# Patient Record
Sex: MALE | Race: WHITE | NOT HISPANIC OR LATINO | Employment: FULL TIME | URBAN - METROPOLITAN AREA
[De-identification: names, ages, dates, MRNs, and addresses within clinical notes are randomized per-mention and may not be internally consistent; named-entity substitution may affect disease eponyms.]

---

## 2019-09-21 ENCOUNTER — HOSPITAL ENCOUNTER (EMERGENCY)
Facility: MEDICAL CENTER | Age: 39
End: 2019-09-21
Attending: EMERGENCY MEDICINE
Payer: COMMERCIAL

## 2019-09-21 VITALS
HEIGHT: 76 IN | TEMPERATURE: 98.2 F | BODY MASS INDEX: 30.34 KG/M2 | DIASTOLIC BLOOD PRESSURE: 74 MMHG | RESPIRATION RATE: 16 BRPM | HEART RATE: 78 BPM | WEIGHT: 249.12 LBS | SYSTOLIC BLOOD PRESSURE: 156 MMHG | OXYGEN SATURATION: 100 %

## 2019-09-21 DIAGNOSIS — K70.10 CHRONIC ALCOHOLIC HEPATITIS: ICD-10-CM

## 2019-09-21 DIAGNOSIS — F41.9 ANXIETY: ICD-10-CM

## 2019-09-21 LAB
ALBUMIN SERPL BCP-MCNC: 5 G/DL (ref 3.2–4.9)
ALBUMIN/GLOB SERPL: 2 G/DL
ALP SERPL-CCNC: 59 U/L (ref 30–99)
ALT SERPL-CCNC: 59 U/L (ref 2–50)
ANION GAP SERPL CALC-SCNC: 12 MMOL/L (ref 0–11.9)
AST SERPL-CCNC: 98 U/L (ref 12–45)
BASOPHILS # BLD AUTO: 0.6 % (ref 0–1.8)
BASOPHILS # BLD: 0.04 K/UL (ref 0–0.12)
BILIRUB SERPL-MCNC: 0.8 MG/DL (ref 0.1–1.5)
BUN SERPL-MCNC: 10 MG/DL (ref 8–22)
CALCIUM SERPL-MCNC: 9.6 MG/DL (ref 8.5–10.5)
CHLORIDE SERPL-SCNC: 101 MMOL/L (ref 96–112)
CO2 SERPL-SCNC: 23 MMOL/L (ref 20–33)
CREAT SERPL-MCNC: 0.93 MG/DL (ref 0.5–1.4)
EKG IMPRESSION: NORMAL
EOSINOPHIL # BLD AUTO: 0.11 K/UL (ref 0–0.51)
EOSINOPHIL NFR BLD: 1.6 % (ref 0–6.9)
ERYTHROCYTE [DISTWIDTH] IN BLOOD BY AUTOMATED COUNT: 41.8 FL (ref 35.9–50)
GLOBULIN SER CALC-MCNC: 2.5 G/DL (ref 1.9–3.5)
GLUCOSE SERPL-MCNC: 96 MG/DL (ref 65–99)
HCT VFR BLD AUTO: 47.7 % (ref 42–52)
HGB BLD-MCNC: 16.2 G/DL (ref 14–18)
IMM GRANULOCYTES # BLD AUTO: 0.07 K/UL (ref 0–0.11)
IMM GRANULOCYTES NFR BLD AUTO: 1 % (ref 0–0.9)
LYMPHOCYTES # BLD AUTO: 1.39 K/UL (ref 1–4.8)
LYMPHOCYTES NFR BLD: 20.4 % (ref 22–41)
MAGNESIUM SERPL-MCNC: 1.8 MG/DL (ref 1.5–2.5)
MCH RBC QN AUTO: 29.7 PG (ref 27–33)
MCHC RBC AUTO-ENTMCNC: 34 G/DL (ref 33.7–35.3)
MCV RBC AUTO: 87.4 FL (ref 81.4–97.8)
MONOCYTES # BLD AUTO: 0.76 K/UL (ref 0–0.85)
MONOCYTES NFR BLD AUTO: 11.2 % (ref 0–13.4)
NEUTROPHILS # BLD AUTO: 4.43 K/UL (ref 1.82–7.42)
NEUTROPHILS NFR BLD: 65.2 % (ref 44–72)
NRBC # BLD AUTO: 0 K/UL
NRBC BLD-RTO: 0 /100 WBC
PLATELET # BLD AUTO: 143 K/UL (ref 164–446)
PMV BLD AUTO: 10.9 FL (ref 9–12.9)
POTASSIUM SERPL-SCNC: 3.7 MMOL/L (ref 3.6–5.5)
PROT SERPL-MCNC: 7.5 G/DL (ref 6–8.2)
RBC # BLD AUTO: 5.46 M/UL (ref 4.7–6.1)
SODIUM SERPL-SCNC: 136 MMOL/L (ref 135–145)
WBC # BLD AUTO: 6.8 K/UL (ref 4.8–10.8)

## 2019-09-21 PROCEDURE — 93005 ELECTROCARDIOGRAM TRACING: CPT

## 2019-09-21 PROCEDURE — 99284 EMERGENCY DEPT VISIT MOD MDM: CPT

## 2019-09-21 PROCEDURE — 96374 THER/PROPH/DIAG INJ IV PUSH: CPT

## 2019-09-21 PROCEDURE — 93005 ELECTROCARDIOGRAM TRACING: CPT | Performed by: EMERGENCY MEDICINE

## 2019-09-21 PROCEDURE — 700111 HCHG RX REV CODE 636 W/ 250 OVERRIDE (IP): Performed by: EMERGENCY MEDICINE

## 2019-09-21 PROCEDURE — 83735 ASSAY OF MAGNESIUM: CPT

## 2019-09-21 PROCEDURE — 85025 COMPLETE CBC W/AUTO DIFF WBC: CPT

## 2019-09-21 PROCEDURE — 80053 COMPREHEN METABOLIC PANEL: CPT

## 2019-09-21 RX ORDER — LORAZEPAM 1 MG/1
1 TABLET ORAL EVERY 4 HOURS PRN
Qty: 15 TAB | Refills: 0 | Status: SHIPPED | OUTPATIENT
Start: 2019-09-21 | End: 2019-09-26

## 2019-09-21 RX ORDER — LORAZEPAM 2 MG/ML
1 INJECTION INTRAMUSCULAR ONCE
Status: COMPLETED | OUTPATIENT
Start: 2019-09-21 | End: 2019-09-21

## 2019-09-21 RX ADMIN — LORAZEPAM 1 MG: 2 INJECTION INTRAMUSCULAR; INTRAVENOUS at 13:02

## 2019-09-21 SDOH — HEALTH STABILITY: MENTAL HEALTH: HOW OFTEN DO YOU HAVE A DRINK CONTAINING ALCOHOL?: NEVER

## 2019-09-21 NOTE — ED NOTES
Pt states anxiety is much better after ativan given. Pt resting in bed NAD. Will continue to monitor

## 2019-09-21 NOTE — ED PROVIDER NOTES
"ED Provider Note    CHIEF COMPLAINT  Chief Complaint   Patient presents with   • Anxiety     Pt reports some recent new medical problems, recent admit/ d/c from [facility] for blood in stool, pt states that he had recent psychotic break and also spent some time at [facility]. pt reporting in triage that he feels he has something terminal and is going to \"drop dead\". Pt denies SI/HI   • Palpitations     came and went yesterday, not currently present   • Stress     states recent stress in life is causing him to feel overwhelmed.        HPI  Rigoberto Mckeon is a 38 y.o. male who presents for evaluation of anxiety palpitations.  The patient apparently has a history of long-standing anxiety.  He admits that he self treats with alcohol on a regular basis.  He was seen at [facility] apparently last week for crampy abdominal pain and some hematochezia.  He reports he had blood tests and a CAT scan which were reportedly unremarkable.  He admits to having a few beers last night.  He specifically denies auditory or visual hallucinations, suicidal ideation or planning.  He did reports he gets very anxious and would like to stop drinking but the alcohol cessation causes anxiety to get worse and then he reverts to drinking.  He has no history of withdrawal seizures.  No other symptoms reported    REVIEW OF SYSTEMS  See HPI for further details.  No chest pain fevers chills night sweats weight loss all other systems are negative.     PAST MEDICAL HISTORY  No past medical history on file.  History of alcohol abuse and anxiety  FAMILY HISTORY  Noncontributory    SOCIAL HISTORY  Social History     Socioeconomic History   • Marital status: Single     Spouse name: Not on file   • Number of children: Not on file   • Years of education: Not on file   • Highest education level: Not on file   Occupational History   • Not on file   Social Needs   • Financial resource strain: Not on file   • Food insecurity:     Worry: Not on " "file     Inability: Not on file   • Transportation needs:     Medical: Not on file     Non-medical: Not on file   Tobacco Use   • Smoking status: Never Smoker   • Smokeless tobacco: Never Used   Substance and Sexual Activity   • Alcohol use: Never     Frequency: Never   • Drug use: Yes     Comment: cocaine 1 wk ago   • Sexual activity: Not on file   Lifestyle   • Physical activity:     Days per week: Not on file     Minutes per session: Not on file   • Stress: Not on file   Relationships   • Social connections:     Talks on phone: Not on file     Gets together: Not on file     Attends Congregational service: Not on file     Active member of club or organization: Not on file     Attends meetings of clubs or organizations: Not on file     Relationship status: Not on file   • Intimate partner violence:     Fear of current or ex partner: Not on file     Emotionally abused: Not on file     Physically abused: Not on file     Forced sexual activity: Not on file   Other Topics Concern   • Not on file   Social History Narrative   • Not on file     Daily alcohol use  SURGICAL HISTORY  No past surgical history on file.  None reported  CURRENT MEDICATIONS  Home Medications    **Home medications have not yet been reviewed for this encounter**       No regular meds  ALLERGIES  No Known Allergies    PHYSICAL EXAM  VITAL SIGNS: /74   Pulse 78   Temp 36.8 °C (98.2 °F) (Temporal)   Resp 16   Ht 1.93 m (6' 4\")   Wt 113 kg (249 lb 1.9 oz)   SpO2 100%   BMI 30.32 kg/m²       Constitutional: Well developed, Well nourished, No acute distress, Non-toxic appearance.   HENT: Normocephalic, Atraumatic, Bilateral external ears normal, Oropharynx moist, No oral exudates, Nose normal.   Eyes: PERRLA, EOMI, Conjunctiva normal, No discharge.   Neck: Normal range of motion, No tenderness, Supple, No stridor.   Cardiovascular: Mildly tachycardic, Normal rhythm, No murmurs, No rubs, No gallops.   Thorax & Lungs: Normal breath sounds, No " respiratory distress, No wheezing, No chest tenderness.   Abdomen: Bowel sounds normal, Soft, No tenderness, No masses, No pulsatile masses.   Skin: Warm, Dry, No erythema, No rash.   Back: No tenderness, No CVA tenderness.   Extremities: Intact distal pulses, No edema, No tenderness, No cyanosis, No clubbing.   Musculoskeletal: Good range of motion in all major joints. No tenderness to palpation or major deformities noted.   Neurologic: Alert & oriented x 3, Normal motor function, Normal sensory function, No focal deficits noted.   Psychiatric: Anxious no suggestion of suicidal ideation no suggestion of psychosis l.       EKG interpretation by me sinus rhythm no acute ST segment elevation or depression no biological T wave inversion no ectopy intervals and axis are normal no suggestion of arrhythmia ectopy or ischemia  COURSE & MEDICAL DECISION MAKING  Pertinent Labs & Imaging studies reviewed. (See chart for details)  Results for orders placed or performed during the hospital encounter of 09/21/19   CBC WITH DIFFERENTIAL   Result Value Ref Range    WBC 6.8 4.8 - 10.8 K/uL    RBC 5.46 4.70 - 6.10 M/uL    Hemoglobin 16.2 14.0 - 18.0 g/dL    Hematocrit 47.7 42.0 - 52.0 %    MCV 87.4 81.4 - 97.8 fL    MCH 29.7 27.0 - 33.0 pg    MCHC 34.0 33.7 - 35.3 g/dL    RDW 41.8 35.9 - 50.0 fL    Platelet Count 143 (L) 164 - 446 K/uL    MPV 10.9 9.0 - 12.9 fL    Neutrophils-Polys 65.20 44.00 - 72.00 %    Lymphocytes 20.40 (L) 22.00 - 41.00 %    Monocytes 11.20 0.00 - 13.40 %    Eosinophils 1.60 0.00 - 6.90 %    Basophils 0.60 0.00 - 1.80 %    Immature Granulocytes 1.00 (H) 0.00 - 0.90 %    Nucleated RBC 0.00 /100 WBC    Neutrophils (Absolute) 4.43 1.82 - 7.42 K/uL    Lymphs (Absolute) 1.39 1.00 - 4.80 K/uL    Monos (Absolute) 0.76 0.00 - 0.85 K/uL    Eos (Absolute) 0.11 0.00 - 0.51 K/uL    Baso (Absolute) 0.04 0.00 - 0.12 K/uL    Immature Granulocytes (abs) 0.07 0.00 - 0.11 K/uL    NRBC (Absolute) 0.00 K/uL   Comp Metabolic Panel    Result Value Ref Range    Sodium 136 135 - 145 mmol/L    Potassium 3.7 3.6 - 5.5 mmol/L    Chloride 101 96 - 112 mmol/L    Co2 23 20 - 33 mmol/L    Anion Gap 12.0 (H) 0.0 - 11.9    Glucose 96 65 - 99 mg/dL    Bun 10 8 - 22 mg/dL    Creatinine 0.93 0.50 - 1.40 mg/dL    Calcium 9.6 8.5 - 10.5 mg/dL    AST(SGOT) 98 (H) 12 - 45 U/L    ALT(SGPT) 59 (H) 2 - 50 U/L    Alkaline Phosphatase 59 30 - 99 U/L    Total Bilirubin 0.8 0.1 - 1.5 mg/dL    Albumin 5.0 (H) 3.2 - 4.9 g/dL    Total Protein 7.5 6.0 - 8.2 g/dL    Globulin 2.5 1.9 - 3.5 g/dL    A-G Ratio 2.0 g/dL   MAGNESIUM   Result Value Ref Range    Magnesium 1.8 1.5 - 2.5 mg/dL   ESTIMATED GFR   Result Value Ref Range    GFR If African American >60 >60 mL/min/1.73 m 2    GFR If Non African American >60 >60 mL/min/1.73 m 2   EKG (NOW)   Result Value Ref Range    Report       [facility] Emergency Dept.    Test Date:  2019  Pt Name:    JEFFREY VALENTINO              Department: ER  MRN:        [id number]                      Room:  Gender:     Male                         Technician: [id number]  :        1980                   Requested By:ER TRIAGE PROTOCOL  Order #:    [id number]                    Reading MD: BASSEM YBARRA MD    Measurements  Intervals                                Axis  Rate:       97                           P:          40  DC:         160                          QRS:        47  QRSD:       96                           T:          39  QT:         352  QTc:        447    Interpretive Statements  SINUS RHYTHM  CONSIDER LEFT ATRIAL ABNORMALITY  No previous ECG available for comparison    Electronically Signed On 2019 12:41:11 PDT by BASSEM YBARRA MD        Patient presents with palpitations.  He has no chest pain.  He was monitored on telemetry for several hours had no evidence of arrhythmia or tachydysrhythmia.  His laboratory studies are notable for normal CBC no evidence of infection or anemia.  His metabolic  panel is notable for some mild transaminitis consistent with alcohol abuse.  Multiple attempts at getting results from [facility] were attempted but they never faxed over the results.  The patient has no belly pain currently.  I will give him a small prescription of lorazepam for mild alcohol withdrawal and anxiety but advised him to follow-up with his PCP    FINAL IMPRESSION  1.  Anxiety  2.  Mild alcohol hepatitis         Electronically signed by: Dyllan Fuller, 9/21/2019 12:40 PM

## 2019-09-21 NOTE — ED TRIAGE NOTES
"Chief Complaint   Patient presents with   • Anxiety     Pt reports some recent new medical problems, recent admit/ d/c from [facility] for blood in stool, pt states that he had recent psychotic break and also spent some time at [facility]. pt reporting in triage that he feels he has something terminal and is going to \"drop dead\". Pt denies SI/HI   • Palpitations     came and went yesterday, not currently present   • Stress     states recent stress in life is causing him to feel overwhelmed.      Explained to pt triage process, made pt aware to tell this RN/staff of any changes/concerns, pt verbalized understanding of process and instructions given. Pt to ER kailee.    "

## 2019-09-21 NOTE — ED NOTES
"Pt c/o anxiety with an \"impending doom\" feeling. Denies visual or auditory hallucinations. Denies SI or HI.  Pt states has had recent health issues to include blood in his stool and heart palpitations at home. Pt was seen at [facility] two days ago for bloody stools and states when he was there had a \"psychotic breakdown\" and \"thought people at the hospital were trying to kill me\"  and left [facility]- PD had to be called to calm pt down. Pt denies chest pain or palpitations now, just wants \"a mental and physical checkup.\" Pt hx of anxiety but does not take medication or is being seen by psych.   "

## 2019-09-30 ENCOUNTER — HOSPITAL ENCOUNTER (EMERGENCY)
Facility: MEDICAL CENTER | Age: 39
End: 2019-10-01
Attending: EMERGENCY MEDICINE
Payer: COMMERCIAL

## 2019-09-30 DIAGNOSIS — F41.9 ANXIETY: ICD-10-CM

## 2019-09-30 DIAGNOSIS — G47.00 INSOMNIA, UNSPECIFIED TYPE: ICD-10-CM

## 2019-09-30 LAB
ALBUMIN SERPL BCP-MCNC: 4.6 G/DL (ref 3.2–4.9)
ALBUMIN/GLOB SERPL: 2.1 G/DL
ALP SERPL-CCNC: 50 U/L (ref 30–99)
ALT SERPL-CCNC: 32 U/L (ref 2–50)
AMPHET UR QL SCN: NEGATIVE
ANION GAP SERPL CALC-SCNC: 10 MMOL/L (ref 0–11.9)
APPEARANCE UR: CLEAR
AST SERPL-CCNC: 31 U/L (ref 12–45)
BARBITURATES UR QL SCN: NEGATIVE
BASOPHILS # BLD AUTO: 0.6 % (ref 0–1.8)
BASOPHILS # BLD: 0.04 K/UL (ref 0–0.12)
BENZODIAZ UR QL SCN: NEGATIVE
BILIRUB SERPL-MCNC: 0.5 MG/DL (ref 0.1–1.5)
BILIRUB UR QL STRIP.AUTO: NEGATIVE
BUN SERPL-MCNC: 9 MG/DL (ref 8–22)
BZE UR QL SCN: NEGATIVE
CALCIUM SERPL-MCNC: 8.9 MG/DL (ref 8.5–10.5)
CANNABINOIDS UR QL SCN: POSITIVE
CHLORIDE SERPL-SCNC: 102 MMOL/L (ref 96–112)
CO2 SERPL-SCNC: 25 MMOL/L (ref 20–33)
COLOR UR: YELLOW
CREAT SERPL-MCNC: 1.14 MG/DL (ref 0.5–1.4)
EOSINOPHIL # BLD AUTO: 0.14 K/UL (ref 0–0.51)
EOSINOPHIL NFR BLD: 1.9 % (ref 0–6.9)
ERYTHROCYTE [DISTWIDTH] IN BLOOD BY AUTOMATED COUNT: 43.8 FL (ref 35.9–50)
ETHANOL BLD-MCNC: 0.01 G/DL
GLOBULIN SER CALC-MCNC: 2.2 G/DL (ref 1.9–3.5)
GLUCOSE SERPL-MCNC: 97 MG/DL (ref 65–99)
GLUCOSE UR STRIP.AUTO-MCNC: NEGATIVE MG/DL
HCT VFR BLD AUTO: 46.2 % (ref 42–52)
HGB BLD-MCNC: 16 G/DL (ref 14–18)
IMM GRANULOCYTES # BLD AUTO: 0.1 K/UL (ref 0–0.11)
IMM GRANULOCYTES NFR BLD AUTO: 1.4 % (ref 0–0.9)
KETONES UR STRIP.AUTO-MCNC: NEGATIVE MG/DL
LEUKOCYTE ESTERASE UR QL STRIP.AUTO: NEGATIVE
LIPASE SERPL-CCNC: 23 U/L (ref 11–82)
LYMPHOCYTES # BLD AUTO: 2.08 K/UL (ref 1–4.8)
LYMPHOCYTES NFR BLD: 28.9 % (ref 22–41)
MAGNESIUM SERPL-MCNC: 1.7 MG/DL (ref 1.5–2.5)
MCH RBC QN AUTO: 30.8 PG (ref 27–33)
MCHC RBC AUTO-ENTMCNC: 34.6 G/DL (ref 33.7–35.3)
MCV RBC AUTO: 89 FL (ref 81.4–97.8)
METHADONE UR QL SCN: NEGATIVE
MICRO URNS: NORMAL
MONOCYTES # BLD AUTO: 0.78 K/UL (ref 0–0.85)
MONOCYTES NFR BLD AUTO: 10.8 % (ref 0–13.4)
NEUTROPHILS # BLD AUTO: 4.06 K/UL (ref 1.82–7.42)
NEUTROPHILS NFR BLD: 56.4 % (ref 44–72)
NITRITE UR QL STRIP.AUTO: NEGATIVE
NRBC # BLD AUTO: 0 K/UL
NRBC BLD-RTO: 0 /100 WBC
OPIATES UR QL SCN: NEGATIVE
OXYCODONE UR QL SCN: NEGATIVE
PCP UR QL SCN: NEGATIVE
PH UR STRIP.AUTO: 5 [PH] (ref 5–8)
PLATELET # BLD AUTO: 162 K/UL (ref 164–446)
PMV BLD AUTO: 10.4 FL (ref 9–12.9)
POTASSIUM SERPL-SCNC: 4.1 MMOL/L (ref 3.6–5.5)
PROPOXYPH UR QL SCN: NEGATIVE
PROT SERPL-MCNC: 6.8 G/DL (ref 6–8.2)
PROT UR QL STRIP: NEGATIVE MG/DL
RBC # BLD AUTO: 5.19 M/UL (ref 4.7–6.1)
RBC UR QL AUTO: NEGATIVE
SODIUM SERPL-SCNC: 137 MMOL/L (ref 135–145)
SP GR UR STRIP.AUTO: 1.01
TROPONIN T SERPL-MCNC: 9 NG/L (ref 6–19)
UROBILINOGEN UR STRIP.AUTO-MCNC: 0.2 MG/DL
WBC # BLD AUTO: 7.2 K/UL (ref 4.8–10.8)

## 2019-09-30 PROCEDURE — 93005 ELECTROCARDIOGRAM TRACING: CPT | Performed by: EMERGENCY MEDICINE

## 2019-09-30 PROCEDURE — 83690 ASSAY OF LIPASE: CPT

## 2019-09-30 PROCEDURE — 83735 ASSAY OF MAGNESIUM: CPT

## 2019-09-30 PROCEDURE — 84484 ASSAY OF TROPONIN QUANT: CPT

## 2019-09-30 PROCEDURE — 80053 COMPREHEN METABOLIC PANEL: CPT

## 2019-09-30 PROCEDURE — 81003 URINALYSIS AUTO W/O SCOPE: CPT

## 2019-09-30 PROCEDURE — 80307 DRUG TEST PRSMV CHEM ANLYZR: CPT

## 2019-09-30 PROCEDURE — 85025 COMPLETE CBC W/AUTO DIFF WBC: CPT

## 2019-09-30 PROCEDURE — A9270 NON-COVERED ITEM OR SERVICE: HCPCS | Performed by: EMERGENCY MEDICINE

## 2019-09-30 PROCEDURE — 99285 EMERGENCY DEPT VISIT HI MDM: CPT

## 2019-09-30 PROCEDURE — 700102 HCHG RX REV CODE 250 W/ 637 OVERRIDE(OP): Performed by: EMERGENCY MEDICINE

## 2019-09-30 RX ORDER — THIAMINE MONONITRATE (VIT B1) 100 MG
100 TABLET ORAL ONCE
Status: COMPLETED | OUTPATIENT
Start: 2019-09-30 | End: 2019-09-30

## 2019-09-30 RX ORDER — LORAZEPAM 1 MG/1
1 TABLET ORAL ONCE
Status: COMPLETED | OUTPATIENT
Start: 2019-09-30 | End: 2019-09-30

## 2019-09-30 RX ADMIN — LORAZEPAM 1 MG: 1 TABLET ORAL at 21:53

## 2019-09-30 RX ADMIN — Medication 100 MG: at 21:53

## 2019-09-30 SDOH — HEALTH STABILITY: MENTAL HEALTH: HOW MANY STANDARD DRINKS CONTAINING ALCOHOL DO YOU HAVE ON A TYPICAL DAY?: 10 OR MORE

## 2019-09-30 SDOH — HEALTH STABILITY: MENTAL HEALTH: HOW OFTEN DO YOU HAVE A DRINK CONTAINING ALCOHOL?: 4 OR MORE TIMES A WEEK

## 2019-09-30 ASSESSMENT — LIFESTYLE VARIABLES
HAVE PEOPLE ANNOYED YOU BY CRITICIZING YOUR DRINKING: NO
HAVE YOU EVER FELT YOU SHOULD CUT DOWN ON YOUR DRINKING: NO
DOES PATIENT WANT TO STOP DRINKING: NO
TOTAL SCORE: 0
DO YOU DRINK ALCOHOL: YES
EVER HAD A DRINK FIRST THING IN THE MORNING TO STEADY YOUR NERVES TO GET RID OF A HANGOVER: NO
EVER FELT BAD OR GUILTY ABOUT YOUR DRINKING: NO
TOTAL SCORE: 0
CONSUMPTION TOTAL: INCOMPLETE
TOTAL SCORE: 0

## 2019-10-01 VITALS
HEART RATE: 74 BPM | BODY MASS INDEX: 30.07 KG/M2 | HEIGHT: 76 IN | RESPIRATION RATE: 16 BRPM | OXYGEN SATURATION: 95 % | TEMPERATURE: 99 F | DIASTOLIC BLOOD PRESSURE: 97 MMHG | WEIGHT: 246.91 LBS | SYSTOLIC BLOOD PRESSURE: 127 MMHG

## 2019-10-01 LAB — EKG IMPRESSION: NORMAL

## 2019-10-01 PROCEDURE — 90791 PSYCH DIAGNOSTIC EVALUATION: CPT

## 2019-10-01 RX ORDER — ZOLPIDEM TARTRATE 5 MG/1
5 TABLET ORAL NIGHTLY PRN
Qty: 2 TAB | Refills: 0 | Status: SHIPPED | OUTPATIENT
Start: 2019-10-01 | End: 2019-10-03

## 2019-10-01 RX ORDER — LORAZEPAM 1 MG/1
1 TABLET ORAL EVERY 4 HOURS PRN
Qty: 5 TAB | Refills: 0 | Status: SHIPPED | OUTPATIENT
Start: 2019-10-01 | End: 2019-10-04

## 2019-10-01 NOTE — CONSULTS
"[facility]   TRIAGE ASSESSMENT    Name: Rigoberto Mckeon  MRN: [id number]  : 1980  Age: 38 y.o.  Date of assessment: 10/1/2019  PCP: Pcp Pt States None  Persons in attendance: Patient    CHIEF COMPLAINT/PRESENTING ISSUE (as stated by pt, erp, rn): This pt presents in the er with complaints of insomnia, racing thoughts, feelings of being delirious and paranoid of other people and situations with people. He appears very manic but denies symptoms of bipolar disorder. He has been binging on beer to cope and recently has used coke and marijuana to self medicate as well. He adamantly denies any si/hi or hallucinations. He denies any hx of these symptoms in the past.  Chief Complaint   Patient presents with   • Insomnia   • Other     \"something is not right with me.  my head isnt right.  i'm mentally not right\"        CURRENT LIVING SITUATION/SOCIAL SUPPORT: This pt lives alone and has no children.[place] He is an only child and his father is . He works at [employer] and notes that he has been experiencing a lot of job stress recently. He has some friends in [place] and claims he has an adequate social support system.    BEHAVIORAL HEALTH TREATMENT HISTORY  Does patient/parent report a history of prior behavioral health treatment for patient?   No:denies hx of    SAFETY ASSESSMENT - SELF  Does patient acknowledge current or past symptoms of dangerousness to self? no  Does parent/significant other report patient has current or past symptoms of dangerousness to self? N\A  Does presenting problem suggest symptoms of dangerousness to self? No pt denies any si or hx of self harm. He denies any access to a firearm. He agrees to seek help or return to the er if any thoughts of self harm develop.     SAFETY ASSESSMENT - OTHERS  Does patient acknowledge current or past symptoms of aggressive behavior or risk to others?no  Does parent/significant other report " "patient has current or past symptoms of aggressive behavior or risk to others?  N\A  Does presenting problem suggest symptoms of dangerousness to others? No pt denies any hi, or hx of hi.    Crisis Safety Plan completed and copy given to patient? yes    ABUSE/NEGLECT SCREENING  Does patient report feeling “unsafe” in his/her home, or afraid of anyone?  no  Does patient report any history of physical, sexual, or emotional abuse?  no  Does parent or significant other report any of the above? N\A  Is there evidence of neglect by self?  no  Is there evidence of neglect by a caregiver? no  Does the patient/parent report any history of CPS/APS/police involvement related to suspected abuse/neglect or domestic violence? no  Based on the information provided during the current assessment, is a mandated report of suspected abuse/neglect being made?  No    SUBSTANCE USE SCREENING  Yes:  Jaylen all substances used in the past 30 days:cocaine, beer, \"pot\"      Last Use Amount   [x]   Alcohol Last noc 12pack of beer   [x]   Marijuana 3days ago A few puffs   []   Heroin     []   Prescription Opioids  (used without prescription, for    recreation, or in excess of prescribed amount)     []   Other Prescription  (used without prescription, for    recreation, or in excess of prescribed amount)     [x]   Cocaine 3weeks ago  A few lines   []   Methamphetamine     []   \"\" drugs (ectasy, MDMA)     []   Other substances        UDS results: pos thc  Breathalyzer results: 0.01    What consequences does the patient associate with any of the above substance use and or addictive behaviors? Health problems: possible, Monetary problems:     Risk factors for detox (check all that apply):  []  Seizures   []  Diaphoretic (sweating)   []  Tremors   []  Hallucinations   []  Increased blood pressure   []  Decreased blood pressure   []  Other   [x]  None      [x] Patient education on risk factors for detoxification and instructed to return to ER " as needed.na    MENTAL STATUS   Participation: Active verbal participation, Attentive, Engaged, Open to feedback and Guarded  Grooming: Casual and Neat  Orientation: Alert and Fully Oriented  Behavior: Tense and Hyperactive  Eye contact: Good  Mood: Anxious and Manic  Affect: Constricted, Congruent with content, Sad and Anxious  Thought process: Logical and Goal-directed  Thought content: Paranoia(some slight noted)  Speech: Rate within normal limits, Volume within normal limits, Pressured and Hypertalkative  Perception: Within normal limits  Memory:  No gross evidence of memory deficits  Insight: Adequate  Judgment:  Adequate  Other:    Collateral information:   Source:  [] Significant other present in person:   [] Significant other by telephone  [] [facility]   [x] [facility] Nursing Staff  [x] [facility]  [x] Other:erp     [] Unable to complete full assessment due to:  [] Acute intoxication  [] Patient declined to participate/engage  [] Patient verbally unresponsive  [] Significant cognitive deficits  [] Significant perceptual distortions or behavioral disorganization  [x] Other: na     CLINICAL IMPRESSIONS:  Primary:  Lita/anxiety disorder possible sleep deprived psychosis  Secondary:  Etoh/ substance abuse       IDENTIFIED NEEDS/PLAN:  [Trigger DISPOSITION list for any items marked]    []  Imminent safety risk - self [] Imminent safety risk - others   []  Acute substance withdrawal []  Psychosis/Impaired reality testing   [x]  Mood/anxiety [x]  Substance use/Addictive behavior   [x]  Maladaptive behaviro []  Parent/child conflict   []  Family/Couples conflict [x]  Biomedical   []  Housing [x]  Financial   []   Legal  Occupational/Educational   []  Domestic violence []  Other:     Disposition: Actively being addressed by 12 Step program: er op referrals for psy, Primary Care Physician, [facility], [facility] and aa meetings    Does patient express agreement with the above plan?  yes    Referral appointment(s) scheduled? no    Alert team only:   I have discussed findings and recommendations with  who is in agreement with these recommendations.38y male presents with acute rebecca and sleep deprivation, denies si,hi and any debilitating psychosis. HE has been discharged home with op referrals and scp     Referral information sent to the following community providers :given to pt    If applicable : Referred  to : margi Mariano R.N.  10/1/2019

## 2019-10-01 NOTE — ED NOTES
Patient awake alert and oriented x 4, [place], bed in low position, call light within reach, on room air, unlabored breathing noted, no cough noted, interacts with staff, interactions noted as appropriate.  
Patient awake alert and oriented x 4, [address], bed in low position, call light within reach, on room air, attached to cardiac monitor, unlabored breathing noted, no cough noted, interacts with staff, interactions noted as appropriate, awaiting [facility] consult at this time.  
Patient awake alert and oriented x 4, [address] 15, bed in low position, call light within reach, on room air, attached to cardiac monitor, unlabored breathing noted, no cough noted, interacts with staff, interactions noted as appropriate, frequent rounding performed, will continue to assess patient.  
Patient awake alert and oriented x 4, [address] 15, bed in low position, call light within reach, on room air, attached to cardiac monitor, unlabored breathing noted, no cough noted, interacts with staff, interactions noted as appropriate, patient offered to watch television but refuses, frequent rounding performed, will continue to assess patient.  
Patient awake alert and oriented x 4, [address] 15, bed in low position, call light within reach, on room air, attached to cardiac monitor, unlabored breathing noted, no cough noted, interacts with staff, interactions noted as appropriate.  
Patient awake alert and oriented x 4, [address] 15, bed in low position, call light within reach, on room air, attached to cardiac monitor, unlabored breathing noted, no cough noted, interacts with staff, interactions noted as appropriate.  
Patient verbalized understanding of discharge instructions, provided with discharge paperwork, gait steady, ambulated independently to STEVIE dugan.  
S[place], [facility], contacted at this time for outpatient resources.  
Detail Level: Zone
Include Location In Plan?: No

## 2019-10-01 NOTE — ED PROVIDER NOTES
"ED Provider Note    CHIEF COMPLAINT  Chief Complaint   Patient presents with   • Insomnia   • Other     \"something is not right with me.  my head isnt right.  i'm mentally not right\"        HPI    Primary care provider: Pcp Pt States None   History obtained from: Patient  History limited by: None     Rigoberto Mckeon is a 38 y.o. male who presents to the ED complaining of feeling \"delirious\" for the past few days.  Patient states \"I may go into shock.\"  He reports being under a lot of stress recently which causes him to go on binge drinking episodes including 12 beers last night.  He denies suicidal or homicidal ideations.  He reports difficulty with sleeping.  He reports past use of cocaine and marijuana but no recent use or other drugs.  He reports difficulty with urination but no pain/discomfort/hematuria.  He denies fever/chills/shortness of breath or difficulty breathing/nausea/vomiting/diarrhea/constipation.  He denies any current tremors or shakes.  He denies any pain at this time.    REVIEW OF SYSTEMS  Please see HPI for pertinent positives/negatives.  All other systems reviewed and are negative.     PAST MEDICAL HISTORY  Past Medical History:   Diagnosis Date   • Hypertension    • Psychiatric disorder     anxiety        SURGICAL HISTORY  History reviewed. No pertinent surgical history.     SOCIAL HISTORY  Social History     Tobacco Use   • Smoking status: Never Smoker   • Smokeless tobacco: Never Used   Substance and Sexual Activity   • Alcohol use: Yes     Alcohol/week: 12.0 oz     Types: 20 Cans of beer per week     Frequency: 4 or more times a week     Drinks per session: 10 or more   • Drug use: Yes     Comment: marijuana, cocaine   • Sexual activity: Not on file        FAMILY HISTORY  History reviewed. No pertinent family history.     CURRENT MEDICATIONS  Home Medications    **Home medications have not yet been reviewed for this encounter**          ALLERGIES  No Known Allergies     PHYSICAL " "EXAM  VITAL SIGNS: /97   Pulse 74   Temp 37.2 °C (99 °F) (Temporal)   Resp 16   Ht 1.93 m (6' 4\")   Wt 112 kg (246 lb 14.6 oz)   SpO2 95%   BMI 30.06 kg/m²  @RENU[[id number]::@     Pulse ox interpretation: 98% I interpret this pulse ox as normal     Constitutional: Well developed, well nourished, alert in no apparent distress, nontoxic appearance    HENT: No external signs of trauma, normocephalic, oropharynx moist and clear, nose normal    Eyes: PERRL, EOMI, vision and visual fields are grossly intact bilaterally, conjunctiva without erythema, no discharge, no icterus    Neck: Soft and supple, trachea midline, no stridor, no tenderness, no LAD, no JVD, good ROM    Cardiovascular: Regular rate and rhythm, no murmurs/rubs/gallops, strong distal pulses and good perfusion    Thorax & Lungs: No respiratory distress, CTAB   Abdomen: Soft, nontender, nondistended, no guarding, no rebound, normal BS    Back: No CVAT    Extremities: No cyanosis, no edema, no gross deformity, good ROM, no tenderness, intact distal pulses with brisk cap refill    Skin: Warm, dry, no pallor/cyanosis, no rash noted    Lymphatic: No lymphadenopathy noted    Neuro: Alert and oriented to person, place, and time.  GCS 15.  CN II-XII grossly intact.  Normal speech.  Equal strength bilateral UE/LE.  Sensation intact to touch.  No cerebellar signs.  No tremors or shakes noted.  Psychiatric: Cooperative, slightly anxious, denies suicidal or homicidal ideations, no signs of active hallucinations or delusions      DIAGNOSTIC STUDIES / PROCEDURES    EKG  12 Lead EKG obtained at 2158 and interpreted by me:   Rate: 88   Rhythm: Sinus rhythm   Ectopy: None  Intervals: Normal   Axis: Normal   Q Waves: None   QRS: Normal   ST segments: Normal  T Waves: Normal    Clinical Impression: Sinus rhythm without acute ischemic changes or dysrhythmia       LABS  All labs reviewed by me.     Results for orders placed or performed during the hospital encounter of " 09/30/19   CBC WITH DIFFERENTIAL   Result Value Ref Range    WBC 7.2 4.8 - 10.8 K/uL    RBC 5.19 4.70 - 6.10 M/uL    Hemoglobin 16.0 14.0 - 18.0 g/dL    Hematocrit 46.2 42.0 - 52.0 %    MCV 89.0 81.4 - 97.8 fL    MCH 30.8 27.0 - 33.0 pg    MCHC 34.6 33.7 - 35.3 g/dL    RDW 43.8 35.9 - 50.0 fL    Platelet Count 162 (L) 164 - 446 K/uL    MPV 10.4 9.0 - 12.9 fL    Neutrophils-Polys 56.40 44.00 - 72.00 %    Lymphocytes 28.90 22.00 - 41.00 %    Monocytes 10.80 0.00 - 13.40 %    Eosinophils 1.90 0.00 - 6.90 %    Basophils 0.60 0.00 - 1.80 %    Immature Granulocytes 1.40 (H) 0.00 - 0.90 %    Nucleated RBC 0.00 /100 WBC    Neutrophils (Absolute) 4.06 1.82 - 7.42 K/uL    Lymphs (Absolute) 2.08 1.00 - 4.80 K/uL    Monos (Absolute) 0.78 0.00 - 0.85 K/uL    Eos (Absolute) 0.14 0.00 - 0.51 K/uL    Baso (Absolute) 0.04 0.00 - 0.12 K/uL    Immature Granulocytes (abs) 0.10 0.00 - 0.11 K/uL    NRBC (Absolute) 0.00 K/uL   COMP METABOLIC PANEL   Result Value Ref Range    Sodium 137 135 - 145 mmol/L    Potassium 4.1 3.6 - 5.5 mmol/L    Chloride 102 96 - 112 mmol/L    Co2 25 20 - 33 mmol/L    Anion Gap 10.0 0.0 - 11.9    Glucose 97 65 - 99 mg/dL    Bun 9 8 - 22 mg/dL    Creatinine 1.14 0.50 - 1.40 mg/dL    Calcium 8.9 8.5 - 10.5 mg/dL    AST(SGOT) 31 12 - 45 U/L    ALT(SGPT) 32 2 - 50 U/L    Alkaline Phosphatase 50 30 - 99 U/L    Total Bilirubin 0.5 0.1 - 1.5 mg/dL    Albumin 4.6 3.2 - 4.9 g/dL    Total Protein 6.8 6.0 - 8.2 g/dL    Globulin 2.2 1.9 - 3.5 g/dL    A-G Ratio 2.1 g/dL   TROPONIN   Result Value Ref Range    Troponin T 9 6 - 19 ng/L   LIPASE   Result Value Ref Range    Lipase 23 11 - 82 U/L   MAGNESIUM   Result Value Ref Range    Magnesium 1.7 1.5 - 2.5 mg/dL   DIAGNOSTIC ALCOHOL   Result Value Ref Range    Diagnostic Alcohol 0.01 (H) 0.00 g/dL   URINE DRUG SCREEN   Result Value Ref Range    Amphetamines Urine Negative Negative    Barbiturates Negative Negative    Benzodiazepines Negative Negative    Cocaine Metabolite  Negative Negative    Methadone Negative Negative    Opiates Negative Negative    Oxycodone Negative Negative    Phencyclidine -Pcp Negative Negative    Propoxyphene Negative Negative    Cannabinoid Metab Positive (A) Negative   URINALYSIS CULTURE, IF INDICATED   Result Value Ref Range    Color Yellow     Character Clear     Specific Gravity 1.014 <1.035    Ph 5.0 5.0 - 8.0    Glucose Negative Negative mg/dL    Ketones Negative Negative mg/dL    Protein Negative Negative mg/dL    Bilirubin Negative Negative    Urobilinogen, Urine 0.2 Negative    Nitrite Negative Negative    Leukocyte Esterase Negative Negative    Occult Blood Negative Negative    Micro Urine Req see below    ESTIMATED GFR   Result Value Ref Range    GFR If African American >60 >60 mL/min/1.73 m 2    GFR If Non African American >60 >60 mL/min/1.73 m 2   EKG (NOW)   Result Value Ref Range    Report       [facility] Emergency Dept.    Test Date:  2019  Pt Name:    JEFFREY VALENTINO              Department: ER  MRN:        [id number]                      Room:       [facility]  Gender:     Male                         Technician: [id number]  :        1980                   Requested By:DOUGLAS NGUYEN  Order #:    [id number]                    Reading MD:    Measurements  Intervals                                Axis  Rate:       88                           P:          28  SC:         159                          QRS:        49  QRSD:       96                           T:          27  QT:         368  QTc:        446    Interpretive Statements  Sinus rhythm  Compared to ECG 2019 10:53:34  No significant changes          RADIOLOGY  The radiologist's interpretation of all radiological studies have been reviewed by me.     No orders to display          COURSE & MEDICAL DECISION MAKING  Nursing notes, VS, PMSFHx reviewed in chart.     Review of past medical records shows the patient was last seen in this ED 2019 for  anxiety, palpitations and stress.      Differential diagnoses considered include but are not limited to: Axiety, depression, psychosis/schizophrenia, personality disorder, intoxication/overdose, electrolyte abnormality, thyroid disorder      History and physical exam as above.  EKG without evidence for acute ischemic changes or dysrhythmia.  Labs were fairly unremarkable except for mild thrombocytopenia that appears stable and chronic compared to prior results.  Patient was given Ativan and thiamine in the ED.  He tolerated oral fluids without difficulty.  I discussed the findings with the patient.  He is noted to be in no acute distress and nontoxic in appearance.  He has been hemodynamically stable except for a few readings of slightly elevated blood pressure for which he can follow-up on outpatient basis for further management.  He continues to deny any suicidal or homicidal ideation and he is not exhibiting any signs of active hallucinations or delusions.  He continues to endorse that he is feeling very anxious and unable to sleep and requested mental health evaluation.  He was seen by life skills and given outpatient resources for follow-up.  He will be discharged home with prescriptions for a few Ativan and Ambien to use as needed.  Return to ED precautions were given.  Patient verbalized understanding and agreed with plan of care with no further questions or concerns      The patient is referred to a primary physician for blood pressure management, diabetic screening, and for all other preventative health concerns.       FINAL IMPRESSION  1. Anxiety Active   2. Insomnia, unspecified type Active   3. Alcoholism /alcohol abuse (HCC) Active          DISPOSITION  Patient will be discharged home in stable condition.       FOLLOW UP  Please follow-up with your doctor and as discussed with life skills    Call today      [facility], Emergency Dept  [address]  [address]  [phone]    If symptoms worsen         OUTPATIENT MEDICATIONS  Discharge Medication List as of 10/1/2019  2:55 AM      START taking these medications    Details   LORazepam (ATIVAN) 1 MG Tab Take 1 Tab by mouth every four hours as needed for Anxiety for up to 3 days., Disp-5 Tab, R-0, Print Rx Paper, For 3 days      zolpidem (AMBIEN) 5 MG Tab Take 1 Tab by mouth at bedtime as needed for Sleep for up to 2 days., Disp-2 Tab, R-0, Print Rx Paper, For 2 days                Electronically signed by: Han Rose, 9/30/2019 9:22 PM      Portions of this record were made with voice recognition software.  Despite my review, spelling/grammar/context errors may still remain.  Interpretation of this chart should be taken in this context.

## 2019-10-01 NOTE — DISCHARGE PLANNING
[facility]  Crisis/Safety Plan    Name:  Rigoberto Mckeon  MRN:  [id number]  Date:  10/1/2019    Warning signs that a crisis may be developing for me or I may be at risk:  1)feeling much more depressed and overwhelmed   2)becoming increasingly anxious  3)developing any thoughts of self harm    Coping strategies I can use on my own (relaxation, physical activity, etc):  1) try to exercise daily  2) read for pleasure  3) take in a movie or watch tv    Ways I can make my environment safe:  1)no firearms in your living guarters  2)secure meds  3)secure any sharps or ropes/chords    Things I want to tell myself when I feel a crisis developin) try to stay calm  2) remember there is help out there  3) get help before a crisis develops    People I can contact for support or distraction (and their phone numbers):  1Marce[place]    2)use numbers below   3)    If I’m not able to reach my support people, or the above strategies don’t help, I can contact the following professionals, agencies, or hotlines:  1) Crisis Call Center ():  [phone] -OR- [phone]  2) Crisis Text Line ():  Text CARE TO 031773  3)   4)     Jaylen Mariano R.N.

## 2019-10-01 NOTE — ED TRIAGE NOTES
"Chief Complaint   Patient presents with   • Insomnia   • Other     \"something is not right with me.  my head isnt right.  i'm mentally not right\"     Pt denies SI/HI.  Pt state that he has been drinking up to 20 beers per day for the past few weeks.  Denies etoh use today.  Last drink last night.  Triage process explained to patient.  Pt back to waiting room.  Pt instructed to inform RN if any changes or questions arise.    "

## 2019-10-28 ENCOUNTER — HOSPITAL ENCOUNTER (EMERGENCY)
Facility: MEDICAL CENTER | Age: 39
End: 2019-10-28
Attending: EMERGENCY MEDICINE
Payer: COMMERCIAL

## 2019-10-28 VITALS
SYSTOLIC BLOOD PRESSURE: 138 MMHG | HEIGHT: 76 IN | OXYGEN SATURATION: 99 % | DIASTOLIC BLOOD PRESSURE: 91 MMHG | HEART RATE: 77 BPM | BODY MASS INDEX: 30.87 KG/M2 | RESPIRATION RATE: 18 BRPM | WEIGHT: 253.53 LBS | TEMPERATURE: 98.1 F

## 2019-10-28 DIAGNOSIS — F10.982 ALCOHOL-INDUCED INSOMNIA (HCC): ICD-10-CM

## 2019-10-28 DIAGNOSIS — F41.9 ANXIETY: ICD-10-CM

## 2019-10-28 LAB
ALBUMIN SERPL BCP-MCNC: 4.6 G/DL (ref 3.2–4.9)
ALBUMIN/GLOB SERPL: 1.7 G/DL
ALP SERPL-CCNC: 44 U/L (ref 30–99)
ALT SERPL-CCNC: 31 U/L (ref 2–50)
AMPHET UR QL SCN: NEGATIVE
ANION GAP SERPL CALC-SCNC: 8 MMOL/L (ref 0–11.9)
AST SERPL-CCNC: 23 U/L (ref 12–45)
BARBITURATES UR QL SCN: NEGATIVE
BASOPHILS # BLD AUTO: 0.9 % (ref 0–1.8)
BASOPHILS # BLD: 0.05 K/UL (ref 0–0.12)
BENZODIAZ UR QL SCN: NEGATIVE
BILIRUB SERPL-MCNC: 0.7 MG/DL (ref 0.1–1.5)
BUN SERPL-MCNC: 7 MG/DL (ref 8–22)
BZE UR QL SCN: NEGATIVE
CALCIUM SERPL-MCNC: 8.9 MG/DL (ref 8.5–10.5)
CANNABINOIDS UR QL SCN: POSITIVE
CHLORIDE SERPL-SCNC: 104 MMOL/L (ref 96–112)
CO2 SERPL-SCNC: 25 MMOL/L (ref 20–33)
CREAT SERPL-MCNC: 0.98 MG/DL (ref 0.5–1.4)
EOSINOPHIL # BLD AUTO: 0.07 K/UL (ref 0–0.51)
EOSINOPHIL NFR BLD: 1.3 % (ref 0–6.9)
ERYTHROCYTE [DISTWIDTH] IN BLOOD BY AUTOMATED COUNT: 42.6 FL (ref 35.9–50)
GLOBULIN SER CALC-MCNC: 2.7 G/DL (ref 1.9–3.5)
GLUCOSE SERPL-MCNC: 90 MG/DL (ref 65–99)
HCT VFR BLD AUTO: 49.1 % (ref 42–52)
HGB BLD-MCNC: 16.7 G/DL (ref 14–18)
IMM GRANULOCYTES # BLD AUTO: 0.1 K/UL (ref 0–0.11)
IMM GRANULOCYTES NFR BLD AUTO: 1.9 % (ref 0–0.9)
LYMPHOCYTES # BLD AUTO: 1.41 K/UL (ref 1–4.8)
LYMPHOCYTES NFR BLD: 26.6 % (ref 22–41)
MCH RBC QN AUTO: 30.2 PG (ref 27–33)
MCHC RBC AUTO-ENTMCNC: 34 G/DL (ref 33.7–35.3)
MCV RBC AUTO: 88.8 FL (ref 81.4–97.8)
METHADONE UR QL SCN: NEGATIVE
MONOCYTES # BLD AUTO: 0.52 K/UL (ref 0–0.85)
MONOCYTES NFR BLD AUTO: 9.8 % (ref 0–13.4)
NEUTROPHILS # BLD AUTO: 3.15 K/UL (ref 1.82–7.42)
NEUTROPHILS NFR BLD: 59.5 % (ref 44–72)
NRBC # BLD AUTO: 0 K/UL
NRBC BLD-RTO: 0 /100 WBC
OPIATES UR QL SCN: NEGATIVE
OXYCODONE UR QL SCN: NEGATIVE
PCP UR QL SCN: NEGATIVE
PLATELET # BLD AUTO: 144 K/UL (ref 164–446)
PMV BLD AUTO: 10.7 FL (ref 9–12.9)
POC BREATHALIZER: 0.02 PERCENT (ref 0–0.01)
POTASSIUM SERPL-SCNC: 4.1 MMOL/L (ref 3.6–5.5)
PROPOXYPH UR QL SCN: NEGATIVE
PROT SERPL-MCNC: 7.3 G/DL (ref 6–8.2)
RBC # BLD AUTO: 5.53 M/UL (ref 4.7–6.1)
SODIUM SERPL-SCNC: 137 MMOL/L (ref 135–145)
TSH SERPL DL<=0.005 MIU/L-ACNC: 3.22 UIU/ML (ref 0.38–5.33)
WBC # BLD AUTO: 5.3 K/UL (ref 4.8–10.8)

## 2019-10-28 PROCEDURE — 700102 HCHG RX REV CODE 250 W/ 637 OVERRIDE(OP): Performed by: EMERGENCY MEDICINE

## 2019-10-28 PROCEDURE — 80307 DRUG TEST PRSMV CHEM ANLYZR: CPT

## 2019-10-28 PROCEDURE — 84443 ASSAY THYROID STIM HORMONE: CPT

## 2019-10-28 PROCEDURE — 80053 COMPREHEN METABOLIC PANEL: CPT

## 2019-10-28 PROCEDURE — 90791 PSYCH DIAGNOSTIC EVALUATION: CPT

## 2019-10-28 PROCEDURE — A9270 NON-COVERED ITEM OR SERVICE: HCPCS | Performed by: EMERGENCY MEDICINE

## 2019-10-28 PROCEDURE — 85025 COMPLETE CBC W/AUTO DIFF WBC: CPT

## 2019-10-28 PROCEDURE — 99285 EMERGENCY DEPT VISIT HI MDM: CPT

## 2019-10-28 PROCEDURE — 302970 POC BREATHALIZER: Performed by: EMERGENCY MEDICINE

## 2019-10-28 RX ORDER — LORAZEPAM 1 MG/1
1 TABLET ORAL ONCE
Status: COMPLETED | OUTPATIENT
Start: 2019-10-28 | End: 2019-10-28

## 2019-10-28 RX ORDER — TRAZODONE HYDROCHLORIDE 50 MG/1
50 TABLET ORAL NIGHTLY
COMMUNITY

## 2019-10-28 RX ADMIN — LORAZEPAM 1 MG: 1 TABLET ORAL at 13:31

## 2019-10-28 NOTE — ED NOTES
Report and pt care from Benedicto RN. Introduced self, urine and labs sent. Denies SI/HI. Admits anxiety.  Able to answer full questions.  Will continue to monitor pt.

## 2019-10-28 NOTE — ED PROVIDER NOTES
"ED Provider Note    CHIEF COMPLAINT  Chief Complaint   Patient presents with   • Anxiety     x2 weeks. \"My anxiety is a 10. I feel like something is really wrong with me\".   • Insomnia       HPI  Rigoberto Mckeon is a 38 y.o. male who presents for evaluation of ongoing anxiety, not feeling well fatigue malaise.  I saw the patient around 6 weeks ago.  He admitted to drinking unhealthy amounts.  He has some mild transaminitis apparently with a recent visit at [facility].  He has been having issues with insomnia and anxiety.  He had been prescribed benzodiazepines and some Ambien as well as trazodone for insomnia through his PCP.  He then establish care briefly with a psychiatrist Dr. Gavin within the last 2 weeks and they continued benzodiazepine therapy.  He reports that he drank some alcohol 3 weeks ago secondary history taking reveals that he had some drinks yesterday now.  He denies any other coingestions such as cocaine or amphetamine.  He reports feeling an impending sense of doom but denies explicit suicidality or auditory or visual hallucinations    REVIEW OF SYSTEMS  See HPI for further details.  No night sweats weight loss numbness tingling weakness rash all other systems are negative.     PAST MEDICAL HISTORY  Past Medical History:   Diagnosis Date   • Hypertension    • Psychiatric disorder     anxiety       FAMILY HISTORY  Noncontributory    SOCIAL HISTORY  Social History     Socioeconomic History   • Marital status: Single     Spouse name: Not on file   • Number of children: Not on file   • Years of education: Not on file   • Highest education level: Not on file   Occupational History   • Not on file   Social Needs   • Financial resource strain: Not on file   • Food insecurity:     Worry: Not on file     Inability: Not on file   • Transportation needs:     Medical: Not on file     Non-medical: Not on file   Tobacco Use   • Smoking status: Never Smoker   • Smokeless tobacco: Never Used   Substance and " "Sexual Activity   • Alcohol use: Yes     Alcohol/week: 12.0 oz     Types: 20 Cans of beer per week     Frequency: 4 or more times a week     Drinks per session: 10 or more   • Drug use: Yes     Comment: marijuana, cocaine   • Sexual activity: Not on file   Lifestyle   • Physical activity:     Days per week: Not on file     Minutes per session: Not on file   • Stress: Not on file   Relationships   • Social connections:     Talks on phone: Not on file     Gets together: Not on file     Attends Protestant service: Not on file     Active member of club or organization: Not on file     Attends meetings of clubs or organizations: Not on file     Relationship status: Not on file   • Intimate partner violence:     Fear of current or ex partner: Not on file     Emotionally abused: Not on file     Physically abused: Not on file     Forced sexual activity: Not on file   Other Topics Concern   • Not on file   Social History Narrative   • Not on file     History of alcohol abuse  SURGICAL HISTORY  No past surgical history on file.    CURRENT MEDICATIONS  Home Medications     Reviewed by Elian Davis R.N. (Registered Nurse) on 10/28/19 at 0952  Med List Status: Complete   Medication Last Dose Status   traZODone (DESYREL) 50 MG Tab 10/27/2019 Active                ALLERGIES  No Known Allergies    PHYSICAL EXAM  VITAL SIGNS: /106 Comment: second pressure 151 102  Pulse 80   Temp 36.7 °C (98.1 °F) (Temporal)   Resp 18   Ht 1.93 m (6' 4\")   Wt 115 kg (253 lb 8.5 oz)   SpO2 99%   BMI 30.86 kg/m²       Constitutional: Well developed, Well nourished, No acute distress, Non-toxic appearance.   HENT: Normocephalic, Atraumatic, Bilateral external ears normal, Oropharynx moist, No oral exudates, Nose normal.   Eyes: PERRLA, EOMI, Conjunctiva normal, No discharge.   Neck: Normal range of motion, No tenderness, Supple, No stridor.   Lymphatic: No lymphadenopathy noted.   Cardiovascular: Normal heart rate, Normal rhythm, No " murmurs, No rubs, No gallops.   Thorax & Lungs: Normal breath sounds, No respiratory distress, No wheezing, No chest tenderness.   Abdomen: Bowel sounds normal, Soft, No tenderness, No masses, No pulsatile masses.   Skin: Warm, Dry, No erythema, No rash.   Extremities: Intact distal pulses, No edema, No tenderness, No cyanosis, No clubbing.   Musculoskeletal: Good range of motion in all major joints. No tenderness to palpation or major deformities noted.   Neurologic: Alert & oriented x 3, Normal motor function, Normal sensory function, No focal deficits noted.   Psychiatric: Patient is anxious denies homicidality or suicidality admits to feeling depressed but does not endorse suicidal ideation no evidence of acute psychosis    Results for orders placed or performed during the hospital encounter of 10/28/19   CBC WITH DIFFERENTIAL   Result Value Ref Range    WBC 5.3 4.8 - 10.8 K/uL    RBC 5.53 4.70 - 6.10 M/uL    Hemoglobin 16.7 14.0 - 18.0 g/dL    Hematocrit 49.1 42.0 - 52.0 %    MCV 88.8 81.4 - 97.8 fL    MCH 30.2 27.0 - 33.0 pg    MCHC 34.0 33.7 - 35.3 g/dL    RDW 42.6 35.9 - 50.0 fL    Platelet Count 144 (L) 164 - 446 K/uL    MPV 10.7 9.0 - 12.9 fL    Neutrophils-Polys 59.50 44.00 - 72.00 %    Lymphocytes 26.60 22.00 - 41.00 %    Monocytes 9.80 0.00 - 13.40 %    Eosinophils 1.30 0.00 - 6.90 %    Basophils 0.90 0.00 - 1.80 %    Immature Granulocytes 1.90 (H) 0.00 - 0.90 %    Nucleated RBC 0.00 /100 WBC    Neutrophils (Absolute) 3.15 1.82 - 7.42 K/uL    Lymphs (Absolute) 1.41 1.00 - 4.80 K/uL    Monos (Absolute) 0.52 0.00 - 0.85 K/uL    Eos (Absolute) 0.07 0.00 - 0.51 K/uL    Baso (Absolute) 0.05 0.00 - 0.12 K/uL    Immature Granulocytes (abs) 0.10 0.00 - 0.11 K/uL    NRBC (Absolute) 0.00 K/uL   Comp Metabolic Panel   Result Value Ref Range    Sodium 137 135 - 145 mmol/L    Potassium 4.1 3.6 - 5.5 mmol/L    Chloride 104 96 - 112 mmol/L    Co2 25 20 - 33 mmol/L    Anion Gap 8.0 0.0 - 11.9    Glucose 90 65 - 99  mg/dL    Bun 7 (L) 8 - 22 mg/dL    Creatinine 0.98 0.50 - 1.40 mg/dL    Calcium 8.9 8.5 - 10.5 mg/dL    AST(SGOT) 23 12 - 45 U/L    ALT(SGPT) 31 2 - 50 U/L    Alkaline Phosphatase 44 30 - 99 U/L    Total Bilirubin 0.7 0.1 - 1.5 mg/dL    Albumin 4.6 3.2 - 4.9 g/dL    Total Protein 7.3 6.0 - 8.2 g/dL    Globulin 2.7 1.9 - 3.5 g/dL    A-G Ratio 1.7 g/dL   TSH   Result Value Ref Range    TSH 3.220 0.380 - 5.330 uIU/mL   URINE DRUG SCREEN   Result Value Ref Range    Amphetamines Urine Negative Negative    Barbiturates Negative Negative    Benzodiazepines Negative Negative    Cocaine Metabolite Negative Negative    Methadone Negative Negative    Opiates Negative Negative    Oxycodone Negative Negative    Phencyclidine -Pcp Negative Negative    Propoxyphene Negative Negative    Cannabinoid Metab Positive (A) Negative   ESTIMATED GFR   Result Value Ref Range    GFR If African American >60 >60 mL/min/1.73 m 2    GFR If Non African American >60 >60 mL/min/1.73 m 2   POC BREATHALIZER   Result Value Ref Range    POC Breathalizer 0.023 (A) 0.00 - 0.01 Percent        COURSE & MEDICAL DECISION MAKING  Pertinent Labs & Imaging studies reviewed. (See chart for details)  I reviewed the patient's prior visit history.  He had some basic laboratory studies to check for anemia liver dysfunction kidney dysfunction and thyroid dysfunction.  All these tests were normal.  Consultation with life skills was obtained.  We have arranged outpatient urgent follow-up tomorrow with a new psychiatrist for the patient.  I will not be prescribing an additional benzodiazepine as he has been prescribed these by multiple providers in the past.  He was given 1 Ativan here and we will discharge him home with follow-up with no indication for legalhold    FINAL IMPRESSION  1.  1. Alcohol-induced insomnia (HCC)     2. Anxiety                Electronically signed by: Dyllan Fuller, 10/28/2019 10:27 AM

## 2019-10-28 NOTE — CONSULTS
"[facility]   TRIAGE ASSESSMENT    Name: Rigoberto Mckeon  MRN: [id number]  : 1980  Age: 38 y.o.  Date of assessment: 10/28/2019  PCP: Pcp Pt States None  Persons in attendance: Patient    CHIEF COMPLAINT/PRESENTING ISSUE (as stated by pt): Pt brought self to ER with c/o \"severe anxiety.\"  He reports feeling like he can't function; had to call off work today.  Feels an impending sense of doom, \"like something is really wrong.\"  Chief Complaint   Patient presents with   • Anxiety     x2 weeks. \"My anxiety is a 10. I feel like something is really wrong with me\".   • Insomnia        CURRENT LIVING SITUATION/SOCIAL SUPPORT: Pt lives alone in [place].  He works FT for the [employer].  He is an only child and his father is . He works at [employer] in [place] and notes that he has been experiencing a lot of job stress recently. He has some friends in [place] and claims he has an adequate social support system.    BEHAVIORAL HEALTH TREATMENT HISTORY  Does patient/parent report a history of prior behavioral health treatment for patient?   Yes:    Dates Level of Care Facilty/Provider Diagnosis/Problem Medications   2019 outpt x2 visits Dr Shelby, psychiatry Anx zyprexa-pt reports he only took it for 5 days.  Says it made him have a tremor and dizziness, so stopped taking.  Pt doesn't agree with Dr Shelby's use of an antipsychotic and would like to try a new psychiatrist.                                                                        SAFETY ASSESSMENT - SELF  Does patient acknowledge current or past symptoms of dangerousness to self? no  Does parent/significant other report patient has current or past symptoms of dangerousness to self? N\A  Does presenting problem suggest symptoms of dangerousness to self? No   Pt denies any past SA.    SAFETY ASSESSMENT - OTHERS  Does patient acknowledge current or past symptoms of aggressive behavior or risk to others? no  Does parent/significant other report patient has current or " "past symptoms of aggressive behavior or risk to others?  N\A  Does presenting problem suggest symptoms of dangerousness to others? No    Crisis Safety Plan completed and copy given to patient? no    ABUSE/NEGLECT SCREENING  Does patient report feeling “unsafe” in his/her home, or afraid of anyone?  no  Does patient report any history of physical, sexual, or emotional abuse?  no  Does parent or significant other report any of the above? N\A  Is there evidence of neglect by self?  no  Is there evidence of neglect by a caregiver? no  Does the patient/parent report any history of CPS/APS/police involvement related to suspected abuse/neglect or domestic violence? no  Based on the information provided during the current assessment, is a mandated report of suspected abuse/neglect being made?  No    SUBSTANCE USE SCREENING  Yes:  Jaylen all substances used in the past 30 days:      Last Use Amount   [x]   Alcohol yesterday Uses off and on; reports he is \"self medicating.\"   [x]   Marijuana  occasional   []   Heroin     []   Prescription Opioids  (used without prescription, for    recreation, or in excess of prescribed amount)     []   Other Prescription  (used without prescription, for    recreation, or in excess of prescribed amount)     x   Cocaine   occasional   []   Methamphetamine     []   \"\" drugs (ectasy, MDMA)     []   Other substances        UDS results: +canna  Breathalyzer results: 0.02    What consequences does the patient associate with any of the above substance use and or addictive behaviors? None    Risk factors for detox (check all that apply):  []  Seizures   []  Diaphoretic (sweating)   []  Tremors   []  Hallucinations   []  Increased blood pressure   []  Decreased blood pressure   []  Other   []  None      [] Patient education on risk factors for detoxification and instructed to return to ER as needed.      MENTAL STATUS   Participation: Active verbal participation, Attentive, Engaged and Open to " feedback  Grooming: Casual  Orientation: Alert and Fully Oriented  Behavior: Calm  Eye contact: Good  Mood: Anxious  Affect: Flexible, Full range, Congruent with content and Anxious  Thought process: Logical and Goal-directed  Thought content: Within normal limits  Speech: Rate within normal limits and Volume within normal limits  Perception: Within normal limits  Memory:  No gross evidence of memory deficits  Insight: Adequate  Judgment:  Adequate  Other:    Collateral information: past AT consult, done 10/1/19  Source:  [] Significant other present in person:   [] Significant other by telephone  [] [facility]   [] [facility] Nursing Staff  [] [facility] Medical Record  [] Other:     [] Unable to complete full assessment due to:  [] Acute intoxication  [] Patient declined to participate/engage  [] Patient verbally unresponsive  [] Significant cognitive deficits  [] Significant perceptual distortions or behavioral disorganization  [] Other:      CLINICAL IMPRESSIONS:  Primary:  Anxiety         IDENTIFIED NEEDS/PLAN:  [Trigger DISPOSITION list for any items marked]    []  Imminent safety risk - self [] Imminent safety risk - others   []  Acute substance withdrawal []  Psychosis/Impaired reality testing   [x]  Mood/anxiety [x]  Substance use/Addictive behavior   []  Maladaptive behaviro []  Parent/child conflict   []  Family/Couples conflict []  Biomedical   []  Housing []  Financial   []   Legal  Occupational/Educational   []  Domestic violence []  Other:     Disposition: Refer to [facility]: Outpatient Therapy and Outpatient Medication Management    Does patient express agreement with the above plan? yes    Referral appointment(s) scheduled? Yes.  Scheduled him an appointment tomorrow morning with psychiatrist Dr Vanegas at [facility] at 8:30 am.  Advised him to show up at 8am with ID and insurance card.  Pt also requested work note and one time PRN, both provided per ERP.    Alert  team only: Pt to DC to self.  Denies SI, HI and hallucinations; able to care for self.  I have discussed findings and recommendations with Dr. Fuller, who is in agreement with these recommendations.      Ines Calderon R.N.  10/28/2019

## 2019-10-28 NOTE — ED NOTES
Pt has been medically cleared and cleared by alert team, pt understands to be at follow up appt.  tomorrow morning

## 2019-10-28 NOTE — ED NOTES
"Pt denies suicidal or homicidal ideation.  States \" my anxiety is a 10/10 and I feel like I'm going to die, I just need some help\"    "

## 2019-10-28 NOTE — ED TRIAGE NOTES
"Chief Complaint   Patient presents with   • Anxiety     x2 weeks. \"My anxiety is a 10. I feel like something is really wrong with me\".   • Insomnia     Ambulatory to triage for above. Denies SI/HI. Explained triage process, to waiting room. Asked to inform RN if questions or concerns arise.   "

## 2019-10-29 NOTE — DISCHARGE PLANNING
Alert Team  This note entered the morning after pt's ER visit.  Pt no called/no showed this morning's appointment we made for him.

## 2019-10-30 ENCOUNTER — HOSPITAL ENCOUNTER (EMERGENCY)
Facility: MEDICAL CENTER | Age: 39
End: 2019-10-30
Attending: EMERGENCY MEDICINE
Payer: COMMERCIAL

## 2019-10-30 VITALS
OXYGEN SATURATION: 96 % | TEMPERATURE: 97.7 F | DIASTOLIC BLOOD PRESSURE: 109 MMHG | BODY MASS INDEX: 31.79 KG/M2 | RESPIRATION RATE: 11 BRPM | WEIGHT: 261.02 LBS | HEIGHT: 76 IN | HEART RATE: 88 BPM | SYSTOLIC BLOOD PRESSURE: 152 MMHG

## 2019-10-30 DIAGNOSIS — F51.04 PSYCHOPHYSIOLOGICAL INSOMNIA: ICD-10-CM

## 2019-10-30 DIAGNOSIS — F41.9 ANXIETY DISORDER, UNSPECIFIED TYPE: ICD-10-CM

## 2019-10-30 DIAGNOSIS — I10 UNCONTROLLED HYPERTENSION: ICD-10-CM

## 2019-10-30 DIAGNOSIS — F41.9 ACUTE ANXIETY: ICD-10-CM

## 2019-10-30 LAB — EKG IMPRESSION: NORMAL

## 2019-10-30 PROCEDURE — 700102 HCHG RX REV CODE 250 W/ 637 OVERRIDE(OP): Performed by: EMERGENCY MEDICINE

## 2019-10-30 PROCEDURE — 93005 ELECTROCARDIOGRAM TRACING: CPT | Performed by: EMERGENCY MEDICINE

## 2019-10-30 PROCEDURE — 99284 EMERGENCY DEPT VISIT MOD MDM: CPT

## 2019-10-30 PROCEDURE — A9270 NON-COVERED ITEM OR SERVICE: HCPCS | Performed by: EMERGENCY MEDICINE

## 2019-10-30 RX ORDER — LISINOPRIL 10 MG/1
10 TABLET ORAL DAILY
Qty: 30 TAB | Refills: 0 | Status: SHIPPED | OUTPATIENT
Start: 2019-10-30 | End: 2019-11-12

## 2019-10-30 RX ORDER — LORAZEPAM 1 MG/1
2 TABLET ORAL ONCE
Status: COMPLETED | OUTPATIENT
Start: 2019-10-30 | End: 2019-10-30

## 2019-10-30 RX ORDER — LORAZEPAM 1 MG/1
1 TABLET ORAL ONCE
Status: COMPLETED | OUTPATIENT
Start: 2019-10-30 | End: 2019-10-30

## 2019-10-30 RX ORDER — CLONIDINE HYDROCHLORIDE 0.1 MG/1
0.2 TABLET ORAL ONCE
Status: COMPLETED | OUTPATIENT
Start: 2019-10-30 | End: 2019-10-30

## 2019-10-30 RX ADMIN — CLONIDINE HYDROCHLORIDE 0.2 MG: 0.1 TABLET ORAL at 01:37

## 2019-10-30 RX ADMIN — LORAZEPAM 1 MG: 1 TABLET ORAL at 02:44

## 2019-10-30 RX ADMIN — LORAZEPAM 2 MG: 1 TABLET ORAL at 00:38

## 2019-10-30 NOTE — ED PROVIDER NOTES
CHIEF COMPLAINT  Chief Complaint   Patient presents with   • Anxiety       HPI  Rigoberto Mckeon is a 38 y.o. male who presents tonight after being sent from [facility] secondary to uncontrolled hypertension.  The patient has a history of psychiatric disorder predominantly panic and anxiety.  He was seen at [facility] 2 days ago and was fully worked up by Dr. Fuller and found to be medically stable.  He was instructed to follow-up for psychiatric evaluation and did indeed go to [facility] tonight but his blood pressure was very elevated due to his anxiety and they sent him here to get it under control before they would treat him.  He denies any complaints of chest pain shortness of breath, nausea, vomiting, headache, blurred or double vision.    REVIEW OF SYSTEMS  See HPI for further details. All other system reviews are negative.    PAST MEDICAL HISTORY  Past Medical History:   Diagnosis Date   • Hypertension    • Psychiatric disorder     anxiety       FAMILY HISTORY  History reviewed. No pertinent family history.    SOCIAL HISTORY  Social History     Socioeconomic History   • Marital status: Single     Spouse name: Not on file   • Number of children: Not on file   • Years of education: Not on file   • Highest education level: Not on file   Occupational History   • Not on file   Social Needs   • Financial resource strain: Not on file   • Food insecurity:     Worry: Not on file     Inability: Not on file   • Transportation needs:     Medical: Not on file     Non-medical: Not on file   Tobacco Use   • Smoking status: Never Smoker   • Smokeless tobacco: Never Used   Substance and Sexual Activity   • Alcohol use: Yes     Alcohol/week: 12.0 oz     Types: 20 Cans of beer per week     Frequency: 4 or more times a week     Drinks per session: 10 or more   • Drug use: Yes     Comment: marijuana, cocaine   • Sexual activity: Not on file   Lifestyle   • Physical activity:     Days per week: Not on file  "    Minutes per session: Not on file   • Stress: Not on file   Relationships   • Social connections:     Talks on phone: Not on file     Gets together: Not on file     Attends Quaker service: Not on file     Active member of club or organization: Not on file     Attends meetings of clubs or organizations: Not on file     Relationship status: Not on file   • Intimate partner violence:     Fear of current or ex partner: Not on file     Emotionally abused: Not on file     Physically abused: Not on file     Forced sexual activity: Not on file   Other Topics Concern   • Not on file   Social History Narrative   • Not on file       SURGICAL HISTORY  History reviewed. No pertinent surgical history.    CURRENT MEDICATIONS  See nurse's note    ALLERGIES  No Known Allergies    PHYSICAL EXAM  VITAL SIGNS: BP (!) 175/111   Pulse 82   Temp 36.2 °C (97.2 °F) (Temporal)   Resp (!) 11   Ht 1.93 m (6' 4\")   Wt 118.4 kg (261 lb 0.4 oz)   SpO2 97%   BMI 31.77 kg/m²     Constitutional: Patient is well developed, well nourished in moderate distress due to his anxiety.  HENT: Normocephalic, atraumatic, Oropharynx moist, nose normal with no mucosal edema.  Eyes: PERRL, EOMI, Conjunctiva without erythema.   Neck: Supple with  Normal range of motion in flexion, extension and lateral rotation. No tenderness along the bony prominences or paraspinal muscles.   Cardiovascular: Tachycardic without murmur  Thorax & Lungs: Clear and equal breath sounds with good excursion. No respiratory distress.  Abdomen: Bowel sounds normal in all four quadrants. Soft,nontender.  Skin: Warm, Dry  Back: No cervical, thoracic, or lumbosacral tenderness  Extremities: Peripheral pulses 4/4 No edema, No tenderness  Musculoskeletal: Normal range of motion in all major joints.   Neurologic: Alert & oriented x 3, Normal motor function, Normal sensory function,  DTR's 4/4 bilaterally.  Psychiatric: Affect agitated, his judgment is impaired due to his " psychiatric disorder and his mood is very anxious.    EKG  Twelve-lead EKG was performed and read by myself to show sinus tachycardia at a rate of 109 bpm.  There is no acute ST elevation or depression, no A-V dissociation, no QT prolongation, no axis deviation and no old EKGs for comparison.    COURSE & MEDICAL DECISION MAKING  Pertinent Labs & Imaging studies reviewed. (See chart for details)  Patient was given Ativan 2 mg orally which did seem to improve his anxiety but his blood pressure was still 171/111.  I have given him Catapres 0.2 mg and his blood pressure is come down nicely.  My plan will be to send him back to [facility] tonight for evaluation and treatment for his anxiety and panic disorder.    FINAL IMPRESSION  1.  Uncontrolled hypertension  2.  Acute anxiety   3.  History of anxiety disorder         Electronically signed by: Maddi Lane, 10/30/2019 1:40 [facility] Provider Note

## 2019-10-30 NOTE — DISCHARGE INSTRUCTIONS
Follow up with [facility] for further treatment if you cannot get into see your psychiatrist.  Follow-up with Dr. Shelby today for further medications and treatment.  I am giving you a prescription for your blood pressure, please get it filled and take it daily.

## 2019-10-30 NOTE — ED TRIAGE NOTES
Patient arrives via private vehicle c/o severe anxiety; Patient states he went to [facility] for voluntary admission; [facility] sent patient to the ED for hypertension and for medical clearance. Pt A&O x4; ABC's intact; Patient denies SI or HI. Patient reports drinking 12 beers today; Pt reports insomnia x2 nights

## 2019-10-30 NOTE — ED NOTES
Pt medicated with xanax 1 mg.  Pt instructed not to drive home.  Pt agreed to call [employer].  Security alerted to make sure pt does not drive.  Pt discharged with instructions and script. Pt advised to follow up with Dr. Shelby.  Pt verbalized understanding of discharge instructions.  Pt ambulated out of ED

## 2019-11-01 ENCOUNTER — HOSPITAL ENCOUNTER (EMERGENCY)
Facility: MEDICAL CENTER | Age: 39
End: 2019-11-01
Attending: EMERGENCY MEDICINE
Payer: COMMERCIAL

## 2019-11-01 VITALS
HEART RATE: 89 BPM | DIASTOLIC BLOOD PRESSURE: 78 MMHG | SYSTOLIC BLOOD PRESSURE: 110 MMHG | RESPIRATION RATE: 16 BRPM | WEIGHT: 253.53 LBS | TEMPERATURE: 97 F | OXYGEN SATURATION: 96 % | BODY MASS INDEX: 30.87 KG/M2 | HEIGHT: 76 IN

## 2019-11-01 DIAGNOSIS — F33.2 SEVERE EPISODE OF RECURRENT MAJOR DEPRESSIVE DISORDER, WITHOUT PSYCHOTIC FEATURES (HCC): ICD-10-CM

## 2019-11-01 DIAGNOSIS — F19.10 POLYSUBSTANCE ABUSE (HCC): ICD-10-CM

## 2019-11-01 DIAGNOSIS — F41.9 ANXIETY: ICD-10-CM

## 2019-11-01 LAB
ALBUMIN SERPL BCP-MCNC: 4.2 G/DL (ref 3.2–4.9)
ALBUMIN/GLOB SERPL: 1.8 G/DL
ALP SERPL-CCNC: 50 U/L (ref 30–99)
ALT SERPL-CCNC: 26 U/L (ref 2–50)
AMPHET UR QL SCN: NEGATIVE
ANION GAP SERPL CALC-SCNC: 12 MMOL/L (ref 0–11.9)
APPEARANCE UR: CLEAR
AST SERPL-CCNC: 31 U/L (ref 12–45)
BARBITURATES UR QL SCN: NEGATIVE
BASOPHILS # BLD AUTO: 0.6 % (ref 0–1.8)
BASOPHILS # BLD: 0.05 K/UL (ref 0–0.12)
BENZODIAZ UR QL SCN: NEGATIVE
BILIRUB SERPL-MCNC: 0.8 MG/DL (ref 0.1–1.5)
BILIRUB UR QL STRIP.AUTO: NEGATIVE
BUN SERPL-MCNC: 11 MG/DL (ref 8–22)
BZE UR QL SCN: POSITIVE
CALCIUM SERPL-MCNC: 8.5 MG/DL (ref 8.5–10.5)
CANNABINOIDS UR QL SCN: NEGATIVE
CHLORIDE SERPL-SCNC: 105 MMOL/L (ref 96–112)
CO2 SERPL-SCNC: 19 MMOL/L (ref 20–33)
COLOR UR: YELLOW
CREAT SERPL-MCNC: 1.09 MG/DL (ref 0.5–1.4)
EOSINOPHIL # BLD AUTO: 0.06 K/UL (ref 0–0.51)
EOSINOPHIL NFR BLD: 0.7 % (ref 0–6.9)
ERYTHROCYTE [DISTWIDTH] IN BLOOD BY AUTOMATED COUNT: 42.5 FL (ref 35.9–50)
GLOBULIN SER CALC-MCNC: 2.3 G/DL (ref 1.9–3.5)
GLUCOSE SERPL-MCNC: 119 MG/DL (ref 65–99)
GLUCOSE UR STRIP.AUTO-MCNC: NEGATIVE MG/DL
HCT VFR BLD AUTO: 45.5 % (ref 42–52)
HGB BLD-MCNC: 16.2 G/DL (ref 14–18)
IMM GRANULOCYTES # BLD AUTO: 0.07 K/UL (ref 0–0.11)
IMM GRANULOCYTES NFR BLD AUTO: 0.8 % (ref 0–0.9)
KETONES UR STRIP.AUTO-MCNC: NEGATIVE MG/DL
LEUKOCYTE ESTERASE UR QL STRIP.AUTO: NEGATIVE
LYMPHOCYTES # BLD AUTO: 1.77 K/UL (ref 1–4.8)
LYMPHOCYTES NFR BLD: 20.7 % (ref 22–41)
MCH RBC QN AUTO: 31.2 PG (ref 27–33)
MCHC RBC AUTO-ENTMCNC: 35.6 G/DL (ref 33.7–35.3)
MCV RBC AUTO: 87.7 FL (ref 81.4–97.8)
METHADONE UR QL SCN: NEGATIVE
MICRO URNS: NORMAL
MONOCYTES # BLD AUTO: 1.04 K/UL (ref 0–0.85)
MONOCYTES NFR BLD AUTO: 12.1 % (ref 0–13.4)
NEUTROPHILS # BLD AUTO: 5.58 K/UL (ref 1.82–7.42)
NEUTROPHILS NFR BLD: 65.1 % (ref 44–72)
NITRITE UR QL STRIP.AUTO: NEGATIVE
NRBC # BLD AUTO: 0 K/UL
NRBC BLD-RTO: 0 /100 WBC
OPIATES UR QL SCN: NEGATIVE
OXYCODONE UR QL SCN: NEGATIVE
PCP UR QL SCN: NEGATIVE
PH UR STRIP.AUTO: 5 [PH] (ref 5–8)
PLATELET # BLD AUTO: 145 K/UL (ref 164–446)
PMV BLD AUTO: 10.3 FL (ref 9–12.9)
POC BREATHALIZER: 0.04 PERCENT (ref 0–0.01)
POTASSIUM SERPL-SCNC: 4.1 MMOL/L (ref 3.6–5.5)
PROPOXYPH UR QL SCN: NEGATIVE
PROT SERPL-MCNC: 6.5 G/DL (ref 6–8.2)
PROT UR QL STRIP: NEGATIVE MG/DL
RBC # BLD AUTO: 5.19 M/UL (ref 4.7–6.1)
RBC UR QL AUTO: NEGATIVE
SODIUM SERPL-SCNC: 136 MMOL/L (ref 135–145)
SP GR UR STRIP.AUTO: 1
UROBILINOGEN UR STRIP.AUTO-MCNC: 0.2 MG/DL
WBC # BLD AUTO: 8.6 K/UL (ref 4.8–10.8)

## 2019-11-01 PROCEDURE — 80307 DRUG TEST PRSMV CHEM ANLYZR: CPT

## 2019-11-01 PROCEDURE — 99285 EMERGENCY DEPT VISIT HI MDM: CPT

## 2019-11-01 PROCEDURE — 700102 HCHG RX REV CODE 250 W/ 637 OVERRIDE(OP): Performed by: EMERGENCY MEDICINE

## 2019-11-01 PROCEDURE — 302970 POC BREATHALIZER: Performed by: EMERGENCY MEDICINE

## 2019-11-01 PROCEDURE — 80053 COMPREHEN METABOLIC PANEL: CPT

## 2019-11-01 PROCEDURE — 81003 URINALYSIS AUTO W/O SCOPE: CPT

## 2019-11-01 PROCEDURE — A9270 NON-COVERED ITEM OR SERVICE: HCPCS | Performed by: EMERGENCY MEDICINE

## 2019-11-01 PROCEDURE — 85025 COMPLETE CBC W/AUTO DIFF WBC: CPT

## 2019-11-01 PROCEDURE — 90791 PSYCH DIAGNOSTIC EVALUATION: CPT

## 2019-11-01 PROCEDURE — 302970 POC BREATHALIZER

## 2019-11-01 RX ORDER — LORAZEPAM 1 MG/1
1 TABLET ORAL ONCE
Status: COMPLETED | OUTPATIENT
Start: 2019-11-01 | End: 2019-11-01

## 2019-11-01 RX ADMIN — LORAZEPAM 1 MG: 1 TABLET ORAL at 14:15

## 2019-11-01 NOTE — ED PROVIDER NOTES
"ED Provider Note    Scribed for Inez Alvarenga M.D. by Cyrus Marino. 11/1/2019  1:21 PM    Primary care provider: Jonathan Bonner M.D.  Means of arrival: Walk in  History obtained from: Patient  History limited by: None    CHIEF COMPLAINT  Chief Complaint   Patient presents with   • Suicidal Ideation     no plan       HPI  Rigoberto Mckeon is a 38 y.o. male who presents to the Emergency Department for evaluation of suicidal ideation onset 3 days ago. He states that his suicidal ideation is secondary to his recent medical history. He notes he \"is ready to slit his wrists and hang it up\" and is \"in a downward spiral\". He reports that he has kidney failure in May, 2019 secondary to drug use, which he was seen at [facility] for. He states that he \"feels physically and mentally fucked up\". He reports that he has recently noticed that his urine was foamy recently and he had leg swelling, which made him increasingly anxious about a severe medical problem. He states that he used cocaine last night, occasionally uses marijuana, and binge drinks 24 beers a day. He adds that he attempted to check himself in to [facility] 3 days ago, but they recommended that he report to the ED because his blood pressure was 180/110.    REVIEW OF SYSTEMS  HEENT:  No ear pain, congestion, or sore throat   EYES: no discharge, redness, or vision changes  CARDIAC: no chest pain, no palpitations    PULMONARY: no dyspnea, cough, or congestion   GI: no vomiting, diarrhea, or abdominal pain   : foamy urine. no dysuria, back pain, or hematuria   Neuro: no weakness, numbness, aphasia, or headache  Musculoskeletal: leg swelling. no deformity, pain, or joint swelling  Endocrine: no fevers, sweating, or weight loss   SKIN: no rash, erythema, or contusions   PSYCH: suicidal ideation, anxiety    See history of present illness. All other systems are negative. C.    PAST MEDICAL HISTORY   has a past medical history of " "Hypertension and Psychiatric disorder.    SURGICAL HISTORY  patient denies any surgical history    SOCIAL HISTORY  Social History     Tobacco Use   • Smoking status: Never Smoker   • Smokeless tobacco: Never Used   Substance Use Topics   • Alcohol use: Yes     Alcohol/week: 12.0 oz     Types: 20 Cans of beer per week     Frequency: 4 or more times a week     Drinks per session: 10 or more   • Drug use: Yes     Comment: marijuana, cocaine      Social History     Substance and Sexual Activity   Drug Use Yes    Comment: marijuana, cocaine       FAMILY HISTORY  History reviewed. No pertinent family history.    CURRENT MEDICATIONS  Home Medications     Reviewed by Jocelyn Turner R.N. (Registered Nurse) on 11/01/19 at 1253  Med List Status: Complete   Medication Last Dose Status   lisinopril (PRINIVIL) 10 MG Tab 10/31/2019 Active   traZODone (DESYREL) 50 MG Tab 10/31/2019 Active                ALLERGIES  No Known Allergies    PHYSICAL EXAM  VITAL SIGNS: /84   Pulse (!) 107   Temp 36.1 °C (97 °F) (Temporal)   Resp 16   Ht 1.93 m (6' 4\")   Wt 115 kg (253 lb 8.5 oz)   SpO2 97%   BMI 30.86 kg/m²     Constitutional: Well developed, Well nourished, No acute distress, Non-toxic appearance.   HEENT: Normocephalic, Atraumatic,  external ears normal, pharynx pink,  Mucous  Membranes moist, No rhinorrhea or mucosal edema  Eyes: PERRL, EOMI, Conjunctiva normal, No discharge.   Neck: Normal range of motion, No tenderness, Supple, No stridor.   Lymphatic: No lymphadenopathy    Cardiovascular: Tachycardic Rate and Rhythm, No murmurs,  rubs, or gallops.   Thorax & Lungs: Lungs clear to auscultation bilaterally, No respiratory distress, No wheezes, rhales or rhonchi, No chest wall tenderness.   Abdomen: Bowel sounds normal, Soft, non tender, non distended,  No pulsatile masses., no rebound guarding or peritoneal signs.   Skin: Warm, Dry, No erythema, No rash,   Back:  No CVA tenderness,  No spinal tenderness, bony " crepitance, step offs, or instability.   Neurologic: Alert & oriented x 3, Normal motor function, Normal sensory function, No focal deficits noted. Normal reflexes. Normal Cranial Nerves.  Psychiatric: Anxious, Depression, suicidal ideation.   Extremities: Equal, intact distal pulses, No cyanosis, clubbing or edema,  No tenderness.   Musculoskeletal: Good range of motion in all major joints. No tenderness to palpation or major deformities noted.     DIAGNOSTIC STUDIES / PROCEDURES    LABS  Results for orders placed or performed during the hospital encounter of 11/01/19   Urine Drug Screen   Result Value Ref Range    Amphetamines Urine Negative Negative    Barbiturates Negative Negative    Benzodiazepines Negative Negative    Cocaine Metabolite Positive (A) Negative    Methadone Negative Negative    Opiates Negative Negative    Oxycodone Negative Negative    Phencyclidine -Pcp Negative Negative    Propoxyphene Negative Negative    Cannabinoid Metab Negative Negative   Comp Metabolic Panel   Result Value Ref Range    Sodium 136 135 - 145 mmol/L    Potassium 4.1 3.6 - 5.5 mmol/L    Chloride 105 96 - 112 mmol/L    Co2 19 (L) 20 - 33 mmol/L    Anion Gap 12.0 (H) 0.0 - 11.9    Glucose 119 (H) 65 - 99 mg/dL    Bun 11 8 - 22 mg/dL    Creatinine 1.09 0.50 - 1.40 mg/dL    Calcium 8.5 8.5 - 10.5 mg/dL    AST(SGOT) 31 12 - 45 U/L    ALT(SGPT) 26 2 - 50 U/L    Alkaline Phosphatase 50 30 - 99 U/L    Total Bilirubin 0.8 0.1 - 1.5 mg/dL    Albumin 4.2 3.2 - 4.9 g/dL    Total Protein 6.5 6.0 - 8.2 g/dL    Globulin 2.3 1.9 - 3.5 g/dL    A-G Ratio 1.8 g/dL   CBC WITH DIFFERENTIAL   Result Value Ref Range    WBC 8.6 4.8 - 10.8 K/uL    RBC 5.19 4.70 - 6.10 M/uL    Hemoglobin 16.2 14.0 - 18.0 g/dL    Hematocrit 45.5 42.0 - 52.0 %    MCV 87.7 81.4 - 97.8 fL    MCH 31.2 27.0 - 33.0 pg    MCHC 35.6 (H) 33.7 - 35.3 g/dL    RDW 42.5 35.9 - 50.0 fL    Platelet Count 145 (L) 164 - 446 K/uL    MPV 10.3 9.0 - 12.9 fL    Neutrophils-Polys 65.10  44.00 - 72.00 %    Lymphocytes 20.70 (L) 22.00 - 41.00 %    Monocytes 12.10 0.00 - 13.40 %    Eosinophils 0.70 0.00 - 6.90 %    Basophils 0.60 0.00 - 1.80 %    Immature Granulocytes 0.80 0.00 - 0.90 %    Nucleated RBC 0.00 /100 WBC    Neutrophils (Absolute) 5.58 1.82 - 7.42 K/uL    Lymphs (Absolute) 1.77 1.00 - 4.80 K/uL    Monos (Absolute) 1.04 (H) 0.00 - 0.85 K/uL    Eos (Absolute) 0.06 0.00 - 0.51 K/uL    Baso (Absolute) 0.05 0.00 - 0.12 K/uL    Immature Granulocytes (abs) 0.07 0.00 - 0.11 K/uL    NRBC (Absolute) 0.00 K/uL   ESTIMATED GFR   Result Value Ref Range    GFR If African American >60 >60 mL/min/1.73 m 2    GFR If Non African American >60 >60 mL/min/1.73 m 2   POC BREATHALIZER   Result Value Ref Range    POC Breathalizer 0.04 (A) 0.00 - 0.01 Percent      All labs reviewed by me.    COURSE & MEDICAL DECISION MAKING  Nursing notes, VS, PMSFHx reviewed in chart.    Life skills  1:21 PM - Patient seen and examined at bedside. I informed the patient of my plan to run diagnostic studies to evaluate their symptoms including blood work. I informed him that I will have Life Skills consult him. Patient verbalizes understanding and support with my plan of care.  Patient will be treated with Ativan 1 mg tablet. Ordered CMP, CBC with diff, Urine drug screen, POC Breathalyzer to evaluate his symptoms. The differential diagnoses include but are not limited to: kidney problems, depressed, suicidal.    3:00 PM - Patient was reevaluated at bedside. I informed the patient about his lab results as noted above. He verbalizes understanding.      3:20 PM - The patient is medically clear for transfer to psychiatry. Psychiatry will evaluate the patient and determine if he is available to go home and seek outside treatment.     The patient will return for new or worsening symptoms and is stable at the time of discharge.    The patient is referred to a primary physician for blood pressure management, diabetic screening, and for  all other preventative health concerns.    DISPOSITION:  Pending psychiatric evaluation    FINAL IMPRESSION  1. Severe episode of recurrent major depressive disorder, without psychotic features (HCC)    2. Polysubstance abuse (HCC)          Cyrus DOYLE (Scribe), am scribing for, and in the presence of, Inez Alvarenga M.D..    Electronically signed by: Cyrus Marino (Scribe), 11/1/2019    Inez DOYLE M.D. personally performed the services described in this documentation, as scribed by Cyrus Marino in my presence, and it is both accurate and complete. C.    The note accurately reflects work and decisions made by me.  Inez Alvarenga  11/1/2019  3:57 PM

## 2019-11-01 NOTE — ED NOTES
".  Chief Complaint   Patient presents with   • Suicidal Ideation     no plan     ./84   Pulse (!) 107   Temp 36.1 °C (97 °F) (Temporal)   Resp 16   Ht 1.93 m (6' 4\")   Wt 115 kg (253 lb 8.5 oz)   SpO2 97%   BMI 30.86 kg/m²     Patient ambulatory to triage with suicidal ideation, no specific plan, reports recent binge drinking and cocaine use, patient crying in triage, to GR 39, primary RN aware of patient arrival to room, sitter outside room.    "

## 2019-11-01 NOTE — ED NOTES
Pt presents today with SI, plan to cut wrists in bathtub. Pt admits to drinking 12 beers today and cocaine use last night. No HI. Pt states he is SI due to his undiagnosed kidney failure.

## 2019-11-01 NOTE — ED NOTES
Patient resting in bed, NAD. Respirations even and unlabored. 1:1 sitter in place. Will continue to monitor

## 2019-11-02 NOTE — CONSULTS
"[facility]   TRIAGE ASSESSMENT    Name: Rigoberto Mckeon  MRN: [id number]  : 1980  Age: 38 y.o.  Date of assessment: 2019  PCP: Jonathan Bonner M.D.  Persons in attendance: Patient    CHIEF COMPLAINT/PRESENTING ISSUE (as stated by Rigoberto Mckeon): The patient presents as a 38 year-old male, arriving by himself voluntarily to the ER for initial thoughts of SI. The patient has an extensive history of Generalized Anxiety DO (onset: Childhood) and notes that in the past year he has become focused on his health (\"I've been worried my kidneys are going to fail\"), which has led to binging on alcohol and cocaine, which he reports has amplified these symptoms at times. Upon arrival the patient had labs done and he was later assured that there were no concerns about his health at this time, which he noted \"was a massive relief; I feel so much better now\". The patient reports no history of suicide, self-harm, and denied SI/HI with this writer. He reports he is not taking any medications currently; he notes that he is connected to [facility] (Pt was also provided resources for psychiatry as he is seeking a new one; he saw Dr. Shelby in September two times before deciding to see someone else) for which he will be receiving mental health services from in the coming week. The patient denies AH/VH and presented as alert, oriented, and with an appropriate affect/mood. The patient currently presents with no safety concerns and was able to complete a Crisis/Safety Planning worksheet with this writer at the time of the intervention.   Chief Complaint   Patient presents with   • Suicidal Ideation     no plan        CURRENT LIVING SITUATION/SOCIAL SUPPORT: The patient reports he currently lives alone; he notes that he has friends in the [facility] and has a large supportive network of family that live in [place].     BEHAVIORAL HEALTH TREATMENT HISTORY  Does patient/parent report a history of prior " behavioral health treatment for patient?   Yes:    Dates Level of Care Facilty/Provider Diagnosis/Problem Medications   09/2019 (Pt reports he is currently looking for a new psychiatrist at this time.) OP Dr. Shelby (Psychiatry) [facility] None currently (Per chart history, patient reports he was taking Zyprexa before stopping usage)   Current OP [facility] None currently   2005 OP Psychologist ([place]) [facility] None                                                          SAFETY ASSESSMENT - SELF  Does patient acknowledge current or past symptoms of dangerousness to self? no  Does parent/significant other report patient has current or past symptoms of dangerousness to self? N\A  Does presenting problem suggest symptoms of dangerousness to self? No    SAFETY ASSESSMENT - OTHERS  Does patient acknowledge current or past symptoms of aggressive behavior or risk to others? no  Does parent/significant other report patient has current or past symptoms of aggressive behavior or risk to others?  N\A  Does presenting problem suggest symptoms of dangerousness to others? No    Crisis Safety Plan completed and copy given to patient? yes    ABUSE/NEGLECT SCREENING  Does patient report feeling “unsafe” in his/her home, or afraid of anyone?  no  Does patient report any history of physical, sexual, or emotional abuse?  no  Does parent or significant other report any of the above? N\A  Is there evidence of neglect by self?  no  Is there evidence of neglect by a caregiver? no  Does the patient/parent report any history of CPS/APS/police involvement related to suspected abuse/neglect or domestic violence? no  Based on the information provided during the current assessment, is a mandated report of suspected abuse/neglect being made?  No    SUBSTANCE USE SCREENING  Yes:  [place] all substances used in the past 30 days:      Last Use Amount   []   Alcohol     []   Marijuana     []   Heroin     []   Prescription Opioids  (used without  "prescription, for    recreation, or in excess of prescribed amount)     []   Other Prescription  (used without prescription, for    recreation, or in excess of prescribed amount)     [x]   Cocaine 10/31/2019     []   Methamphetamine     []   \"\" drugs (ectasy, MDMA)     []   Other substances        UDS results: Cocaine  Breathalyzer results: 0.04 @ 1316    What consequences does the patient associate with any of the above substance use and or addictive behaviors? Health problems    Risk factors for detox (check all that apply):  []  Seizures   []  Diaphoretic (sweating)   []  Tremors   []  Hallucinations   []  Increased blood pressure   []  Decreased blood pressure   []  Other   []  None      [] Patient education on risk factors for detoxification and instructed to return to ER as needed.      MENTAL STATUS   Participation: Active verbal participation, Attentive and Engaged  Grooming: Good  Orientation: Alert and Fully Oriented  Behavior: Calm  Eye contact: Good  Mood: Euthymic  Affect: Congruent with content  Thought process: Logical and Goal-directed  Thought content: Within normal limits  Speech: Rate within normal limits and Volume within normal limits  Perception: Within normal limits  Memory:  No gross evidence of memory deficits  Insight: Adequate  Judgment:  Adequate  Other:    Collateral information:   Source:  [] Significant other present in person:   [] Significant other by telephone  [] [facility]   [x] [facility]  [x] [facility]  [] Other:     CLINICAL IMPRESSIONS:  Primary:  Generalized Anxiety DO  Secondary: Stimulant Use DO       IDENTIFIED NEEDS/PLAN:  [Trigger DISPOSITION list for any items marked]    []  Imminent safety risk - self [] Imminent safety risk - others   []  Acute substance withdrawal []  Psychosis/Impaired reality testing   [x]  Mood/anxiety []  Substance use/Addictive behavior   []  Maladaptive behaviro []  Parent/child conflict   []  " Family/Couples conflict []  Biomedical   []  Housing []  Financial   []   Legal  Occupational/Educational   []  Domestic violence []  Other:     Disposition: Consulted with Dr. Samuel; at this time the patient presents with no safety concerns. He will discharge to home and follow-up with OP counseling and psychiatry in the near future.     Does patient express agreement with the above plan? yes    Referral appointment(s) scheduled? no    Alert team only:   I have discussed findings and recommendations with Dr. Samuel who is in agreement with these recommendations.         MICHEAL Ascencio.  11/1/2019

## 2019-11-02 NOTE — ED PROVIDER NOTES
ED Provider Note    Addendum: Please see previous doctor's note.    [facility] evaluated the patient.  They do not believe he is at this time a threat to himself or anyone else.  His laboratory studies are all essentially unrevealing.  Patient is discharged with resources.  He is counseled to avoid further alcohol use it is doubtful he will do so    Impression anxiety

## 2019-11-02 NOTE — DISCHARGE PLANNING
[facility]  Crisis/Safety Plan    Name:  Rigoberto Mckeon  MRN:  [id number]  Date:  2019    Warning signs that a crisis may be developing for me or I may be at risk:  1) I begin to feel like the walls are closing in and my anxiety starts rising  2) I notice I begin to use alcohol or drugs to alleviate my anxiety  3) I struggle with insomnia when I begin to have feeling so guilt, regret, or anxiety    Coping strategies I can use on my own (relaxation, physical activity, etc):  1) Meditation  2) Working out  3) Watching a relaxing movie or listening to peaceful music    Ways I can make my environment safe:  1) Limit/dispose of alcohol and illicit drugs in the home   2) Limit anxiety inducing movies/media content  3)    Things I want to tell myself when I feel a crisis developin) Everything will be okay, these are just thoughts  2) I have so much to live for and so much to look forward to  3)     People I can contact for support or distraction (and their phone numbers):  1) My mother  2) My friend Mingo  3) My Uncle Hayden    If I’m not able to reach my support people, or the above strategies don’t help, I can contact the following professionals, agencies, or hotlines:  1) Crisis Call Center ():  1-190.952.7918 -OR- (923) [phone]  2) Crisis Text Line ():  Text CARE TO 311794  3) 9--  4)     MICHEAL Ascencio.

## 2019-11-04 ENCOUNTER — HOSPITAL ENCOUNTER (EMERGENCY)
Facility: MEDICAL CENTER | Age: 39
End: 2019-11-04
Attending: EMERGENCY MEDICINE
Payer: COMMERCIAL

## 2019-11-04 VITALS
BODY MASS INDEX: 28.01 KG/M2 | OXYGEN SATURATION: 96 % | WEIGHT: 230 LBS | RESPIRATION RATE: 16 BRPM | HEART RATE: 88 BPM | DIASTOLIC BLOOD PRESSURE: 85 MMHG | HEIGHT: 76 IN | SYSTOLIC BLOOD PRESSURE: 140 MMHG | TEMPERATURE: 98.7 F

## 2019-11-04 DIAGNOSIS — R45.851 SUICIDAL IDEATION: ICD-10-CM

## 2019-11-04 DIAGNOSIS — F43.21 SITUATIONAL DEPRESSION: ICD-10-CM

## 2019-11-04 DIAGNOSIS — F19.10 POLYSUBSTANCE ABUSE (HCC): ICD-10-CM

## 2019-11-04 LAB
ALBUMIN SERPL BCP-MCNC: 4.7 G/DL (ref 3.2–4.9)
ALBUMIN/GLOB SERPL: 1.7 G/DL
ALP SERPL-CCNC: 47 U/L (ref 30–99)
ALT SERPL-CCNC: 29 U/L (ref 2–50)
AMPHET UR QL SCN: NEGATIVE
ANION GAP SERPL CALC-SCNC: 11 MMOL/L (ref 0–11.9)
APPEARANCE UR: CLEAR
AST SERPL-CCNC: 30 U/L (ref 12–45)
BARBITURATES UR QL SCN: NEGATIVE
BENZODIAZ UR QL SCN: NEGATIVE
BILIRUB SERPL-MCNC: 1.2 MG/DL (ref 0.1–1.5)
BILIRUB UR QL STRIP.AUTO: NEGATIVE
BUN SERPL-MCNC: 9 MG/DL (ref 8–22)
BZE UR QL SCN: POSITIVE
CALCIUM SERPL-MCNC: 8.9 MG/DL (ref 8.5–10.5)
CANNABINOIDS UR QL SCN: NEGATIVE
CHLORIDE SERPL-SCNC: 102 MMOL/L (ref 96–112)
CO2 SERPL-SCNC: 24 MMOL/L (ref 20–33)
COLOR UR: YELLOW
CREAT SERPL-MCNC: 0.91 MG/DL (ref 0.5–1.4)
ETHANOL BLD-MCNC: 0.08 G/DL
GLOBULIN SER CALC-MCNC: 2.7 G/DL (ref 1.9–3.5)
GLUCOSE SERPL-MCNC: 102 MG/DL (ref 65–99)
GLUCOSE UR STRIP.AUTO-MCNC: NEGATIVE MG/DL
KETONES UR STRIP.AUTO-MCNC: NEGATIVE MG/DL
LEUKOCYTE ESTERASE UR QL STRIP.AUTO: NEGATIVE
METHADONE UR QL SCN: NEGATIVE
MICRO URNS: NORMAL
NITRITE UR QL STRIP.AUTO: NEGATIVE
OPIATES UR QL SCN: NEGATIVE
OXYCODONE UR QL SCN: NEGATIVE
PCP UR QL SCN: NEGATIVE
PH UR STRIP.AUTO: 5 [PH] (ref 5–8)
POC BREATHALIZER: 0.07 PERCENT (ref 0–0.01)
POTASSIUM SERPL-SCNC: 4.1 MMOL/L (ref 3.6–5.5)
PROPOXYPH UR QL SCN: NEGATIVE
PROT SERPL-MCNC: 7.4 G/DL (ref 6–8.2)
PROT UR QL STRIP: NEGATIVE MG/DL
RBC UR QL AUTO: NEGATIVE
SODIUM SERPL-SCNC: 137 MMOL/L (ref 135–145)
SP GR UR STRIP.AUTO: 1
UROBILINOGEN UR STRIP.AUTO-MCNC: 0.2 MG/DL

## 2019-11-04 PROCEDURE — 700102 HCHG RX REV CODE 250 W/ 637 OVERRIDE(OP): Performed by: EMERGENCY MEDICINE

## 2019-11-04 PROCEDURE — 81003 URINALYSIS AUTO W/O SCOPE: CPT

## 2019-11-04 PROCEDURE — 80307 DRUG TEST PRSMV CHEM ANLYZR: CPT

## 2019-11-04 PROCEDURE — 80053 COMPREHEN METABOLIC PANEL: CPT

## 2019-11-04 PROCEDURE — 302970 POC BREATHALIZER: Performed by: EMERGENCY MEDICINE

## 2019-11-04 PROCEDURE — 99285 EMERGENCY DEPT VISIT HI MDM: CPT

## 2019-11-04 PROCEDURE — A9270 NON-COVERED ITEM OR SERVICE: HCPCS | Performed by: EMERGENCY MEDICINE

## 2019-11-04 PROCEDURE — 90791 PSYCH DIAGNOSTIC EVALUATION: CPT

## 2019-11-04 RX ORDER — LORAZEPAM 1 MG/1
1 TABLET ORAL ONCE
Status: COMPLETED | OUTPATIENT
Start: 2019-11-04 | End: 2019-11-04

## 2019-11-04 RX ADMIN — LORAZEPAM 1 MG: 1 TABLET ORAL at 09:08

## 2019-11-04 NOTE — DISCHARGE PLANNING
Medical Social Work    Referral: Legal Hold    Intervention: Legal Hold Paperwork given to SW by [facility] RN Ines Calderon    Legal Hold Initiated: Date: 11/4/19 Time: 0830    Patient’s Insurance Listed on Face Sheet: [employer]/ [employer]    Referrals sent to: [facility], [place], [facility]     This referral contains the following information:  1) Face sheet _x___  2) Page 1 and Page 2 of Legal Hold _x___  3) Alert Team Assessment/Psych Assessment __x__  4) Head to toe physical exam __x__  5) Urine Drug Screen ___x_  6) Blood Alcohol _x___  7) Vital signs __x__  8) Pregnancy test when applicable _n/a__  9) Medications list _x___    Plan: Patient will transfer to mental health facility once acceptance is obtained.

## 2019-11-04 NOTE — ED TRIAGE NOTES
Name and  verified for triage    Pt here via medic with c/o SI and anxiety. States he has been dealing with possible kidney disease and is unable to cope. Admits to cocaine and alcohol use. Denies HI or specific plan of suicide. Admits to wanting to end it all if his health is terminal. Here a few days ago with same complaint.

## 2019-11-04 NOTE — DISCHARGE PLANNING
Alert Team  ERP notified me of consult order.  Pt not currently ready for consult.  Pt will need to be legally sober prior to eval.  I have ordered a STAT POC breathalyzer to reevaluate pt's sobriety.  Pt has been placed on list for consults; he is #3, so will be a few hours.

## 2019-11-04 NOTE — ED NOTES
Alk Phos 596 on 10/18. 10/22 Vitamin D supplementation started 240 IU daily po  Plan: Continue Vitamin D and follow Alk phos.    Assessment completed. Placed on cardiac monitor. Monitor wires, suction, and O2 left in room- all other items removed for pt safety.

## 2019-11-04 NOTE — CONSULTS
"[facility]   TRIAGE ASSESSMENT    Name: Rigoberto Mckeon  MRN: [id number]  : 1980  Age: 38 y.o.  Date of assessment: 2019  PCP: Sendy Bermudez M.D.  Persons in attendance: Patient    CHIEF COMPLAINT/PRESENTING ISSUE (as stated by pt): Pt BIBA for SI and anxiety.  Admits to alcohol and cocaine ingestion.   Pt seen by AT once medically cleared and legally sober.  Pt known to me from several recent ER visits for anxiety; he is now also having SI. \"I am overwhelmed.\"  He continues to report binge alcohol intake in addition to cocaine.  \"I am making really bad decisions\" he said to me three times over the course of his assessment.  He continues to report multiple medical problems that are distressing him and he perseverates on them during my assessment, as well as to the bedside RN.      Chief Complaint   Patient presents with   • Suicidal Ideation   • Anxiety        CURRENT LIVING SITUATION/SOCIAL SUPPORT: Pt lives in [place], works FT for the [employer].  He is an only child and his father is . Notes that he has been experiencing a lot of job stress recently, as well as multiple medical issues he feels are unresolved. He has some friends in [place] and claims he has an adequate social support system.    This is pt's 6th ER visit for the same/similar complaints in the last 6 weeks; 4th Alert Team assessment.    The last two encounters he has escalated to c/o SI in addition to severe anxiety.    BEHAVIORAL HEALTH TREATMENT HISTORY  Does patient/parent report a history of prior behavioral health treatment for patient?   Yes:    Dates Level of Care Facilty/Provider Diagnosis/Problem Medications   2019 outpt x2 visits Dr Shelby, psychiatry Anx zyprexa-pt reports he only took it for 5 days.  Says it made him have a tremor and dizziness, so stopped taking.  Pt doesn't agree with Dr Shelby's use of an antipsychotic and would like to try a new psychiatrist.                                 " "                                                                                 When here 10/28 of this year, I made him a f/u appt at [facility], but he no call/no showed the appointment and was back in the ER the following day.  When I asked him about it, he apologized and said \"I have no good excuse.\"    SAFETY ASSESSMENT - SELF  Does patient acknowledge current or past symptoms of dangerousness to self? yes  Does parent/significant other report patient has current or past symptoms of dangerousness to self? N\A  Does presenting problem suggest symptoms of dangerousness to self? Yes:     Past Current    Suicidal Thoughts: [x]  [x]    Suicidal Plans: []  []    Suicidal Intent: []  []    Suicide Attempts: []  []    Self-Injury []  []      For any boxes checked above, provide detail: Pt reported SI when seen 3 days ago.  He reports SI today.    History of suicide by family member: no  History of suicide by friend/significant other: no  Recent change in frequency/specificity/intensity of suicidal thoughts or self-harm behavior? yes - increased in the past week  Current access to firearms, medications, or other identified means of suicide/self-harm? no  If yes, willing to restrict access to means of suicide/self-harm? N/a    Protective factors present:  none at this time.  Pt has been advised several times to f/u with psychiatry, including having an appointment set up for him on 10/29/19, which he no call/no showed.    SAFETY ASSESSMENT - OTHERS  Does patient acknowledge current or past symptoms of aggressive behavior or risk to others? no  Does parent/significant other report patient has current or past symptoms of aggressive behavior or risk to others?  N\A  Does presenting problem suggest symptoms of dangerousness to others? No    Crisis Safety Plan completed and copy given to patient? N\A    ABUSE/NEGLECT SCREENING  Does patient report feeling “unsafe” in his/her home, or afraid of anyone?  no  Does patient " "report any history of physical, sexual, or emotional abuse?  no  Does parent or significant other report any of the above? N\A  Is there evidence of neglect by self?  no  Is there evidence of neglect by a caregiver? no  Does the patient/parent report any history of CPS/APS/police involvement related to suspected abuse/neglect or domestic violence? no  Based on the information provided during the current assessment, is a mandated report of suspected abuse/neglect being made?  No    SUBSTANCE USE SCREENING  Yes:  Jaylen all substances used in the past 30 days:      Last Use Amount   [x]   Alcohol     [x]   Marijuana     []   Heroin     []   Prescription Opioids  (used without prescription, for    recreation, or in excess of prescribed amount)     []   Other Prescription  (used without prescription, for    recreation, or in excess of prescribed amount)     [x]   Cocaine      []   Methamphetamine     []   \"\" drugs (ectasy, MDMA)     []   Other substances        UDS results: +cocaine  Breathalyzer results: 0.08--0.07    What consequences does the patient associate with any of the above substance use and or addictive behaviors? None    Risk factors for detox (check all that apply):  []  Seizures   []  Diaphoretic (sweating)   []  Tremors   []  Hallucinations   []  Increased blood pressure   []  Decreased blood pressure   []  Other   [x]  None      [x] Patient education on risk factors for detoxification and instructed to return to ER as needed.      MENTAL STATUS   Participation: Active verbal participation  Grooming: Casual  Orientation: Alert and Fully Oriented  Behavior: Calm  Eye contact: Good  Mood: Depressed and Anxious  Affect: Sad and Anxious  Thought process: Logical and Goal-directed  Thought content: Within normal limits  Speech: Rate within normal limits  Perception: Within normal limits  Memory:  No gross evidence of memory deficits  Insight: Limited  Judgment:  Limited  Other:    Collateral " information: past visits  Source:  [] Significant other present in person:   [] Significant other by telephone  [] [facility]   [] [facility]  [x] [facility]  [] Other:     [] Unable to complete full assessment due to:  [] Acute intoxication  [] Patient declined to participate/engage  [] Patient verbally unresponsive  [] Significant cognitive deficits  [] Significant perceptual distortions or behavioral disorganization  [] Other:      CLINICAL IMPRESSIONS:  Primary:  SI  Secondary:  Anxiety       IDENTIFIED NEEDS/PLAN:  [Trigger DISPOSITION list for any items marked]    []  Imminent safety risk - self [] Imminent safety risk - others   []  Acute substance withdrawal []  Psychosis/Impaired reality testing   []  Mood/anxiety []  Substance use/Addictive behavior   []  Maladaptive behaviro []  Parent/child conflict   []  Family/Couples conflict []  Biomedical   []  Housing []  Financial   []   Legal  Occupational/Educational   []  Domestic violence []  Other:     Disposition: Actively being addressed by Legal Hold and [facility]    Does patient express agreement with the above plan? yes    Referral appointment(s) scheduled? N\A    Alert team only: Pt to remain on  for SI.  I have discussed findings and recommendations with Dr. Torres, who is in agreement with these recommendations.     Referral information sent to the following community providers : per     If applicable : Referred  to : [place] for legal hold follow up at (time): 12:50pm      Ines Calderon R.N.  11/4/2019

## 2019-11-04 NOTE — ED PROVIDER NOTES
ED Provider Note    CHIEF COMPLAINT  Chief Complaint   Patient presents with   • Suicidal Ideation   • Anxiety       HPI  Rigoberto Mckeon is a 38 y.o. male who presents to the emergency department complaining of depression and suicidal ideation.    The patient was recently here for depression.  He has been using alcohol and drugs and is been feeling depressed.  He states today his urine was unusually foamy and therefore his kidney failure.  He states that he has kidney failure is wants to kill himself.    Denies any other symptoms.  No back pain.  No chest pain no abdominal pain.  No fevers no chills.  No dysuria hematuria or increased urinary frequency denies any other aggravating leaving factors or associated complaints.  Acute alcohol use and cocaine.     REVIEW OF SYSTEMS  See HPI for further details. All other systems are negative.    PAST MEDICAL HISTORY  Past Medical History:   Diagnosis Date   • Hypertension    • Psychiatric disorder     anxiety       FAMILY HISTORY  No family history on file.    SOCIAL HISTORY  Social History     Socioeconomic History   • Marital status: Single     Spouse name: Not on file   • Number of children: Not on file   • Years of education: Not on file   • Highest education level: Not on file   Occupational History   • Not on file   Social Needs   • Financial resource strain: Not on file   • Food insecurity:     Worry: Not on file     Inability: Not on file   • Transportation needs:     Medical: Not on file     Non-medical: Not on file   Tobacco Use   • Smoking status: Never Smoker   • Smokeless tobacco: Never Used   Substance and Sexual Activity   • Alcohol use: Yes     Alcohol/week: 12.0 oz     Types: 20 Cans of beer per week     Frequency: 4 or more times a week     Drinks per session: 10 or more   • Drug use: Yes     Comment: marijuana, cocaine   • Sexual activity: Not on file   Lifestyle   • Physical activity:     Days per week: Not on file     Minutes per session: Not on  "file   • Stress: Not on file   Relationships   • Social connections:     Talks on phone: Not on file     Gets together: Not on file     Attends Anabaptism service: Not on file     Active member of club or organization: Not on file     Attends meetings of clubs or organizations: Not on file     Relationship status: Not on file   • Intimate partner violence:     Fear of current or ex partner: Not on file     Emotionally abused: Not on file     Physically abused: Not on file     Forced sexual activity: Not on file   Other Topics Concern   • Not on file   Social History Narrative   • Not on file       SURGICAL HISTORY  No past surgical history on file.    CURRENT MEDICATIONS  Home Medications    **Home medications have not yet been reviewed for this encounter**         ALLERGIES  No Known Allergies    PHYSICAL EXAM  VITAL SIGNS: /91   Pulse 100   Temp 37.1 °C (98.7 °F) (Temporal)   Resp 16   Ht 1.93 m (6' 4\")   Wt 104.3 kg (230 lb)   SpO2 98%   BMI 28.00 kg/m²   Constitutional: Well developed, Well nourished, No acute distress, Non-toxic appearance.   HENT: Normocephalic, Atraumatic, Bilateral external ears normal, Oropharynx moist, No oral exudates, Nose normal.   Eyes: PERRL, EOMI, Conjunctiva normal, No discharge.   Neck: Normal range of motion, No tenderness, Supple, No stridor.   Cardiovascular: Normal heart rate, Normal rhythm, No murmurs, No rubs, No gallops.   Thorax & Lungs: Normal breath sounds, No respiratory distress, No wheezing, No chest tenderness.   Abdomen: Bowel sounds normal, Soft, No tenderness,   Skin: Warm, Dry, No erythema, No rash.   Back: No tenderness, No CVA tenderness.   Musculoskeletal: Good range of motion in all major joints.   Neurologic: Alert & oriented x 3, No focal deficits noted.   Psychiatric: Affect depressed with suicidal ideation      Results for orders placed or performed during the hospital encounter of 11/04/19   Urine Drug Screen   Result Value Ref Range    " Amphetamines Urine Negative Negative    Barbiturates Negative Negative    Benzodiazepines Negative Negative    Cocaine Metabolite Positive (A) Negative    Methadone Negative Negative    Opiates Negative Negative    Oxycodone Negative Negative    Phencyclidine -Pcp Negative Negative    Propoxyphene Negative Negative    Cannabinoid Metab Negative Negative   DIAGNOSTIC ALCOHOL (BA)   Result Value Ref Range    Diagnostic Alcohol 0.08 (H) 0.00 g/dL   COMP METABOLIC PANEL   Result Value Ref Range    Sodium 137 135 - 145 mmol/L    Potassium 4.1 3.6 - 5.5 mmol/L    Chloride 102 96 - 112 mmol/L    Co2 24 20 - 33 mmol/L    Anion Gap 11.0 0.0 - 11.9    Glucose 102 (H) 65 - 99 mg/dL    Bun 9 8 - 22 mg/dL    Creatinine 0.91 0.50 - 1.40 mg/dL    Calcium 8.9 8.5 - 10.5 mg/dL    AST(SGOT) 30 12 - 45 U/L    ALT(SGPT) 29 2 - 50 U/L    Alkaline Phosphatase 47 30 - 99 U/L    Total Bilirubin 1.2 0.1 - 1.5 mg/dL    Albumin 4.7 3.2 - 4.9 g/dL    Total Protein 7.4 6.0 - 8.2 g/dL    Globulin 2.7 1.9 - 3.5 g/dL    A-G Ratio 1.7 g/dL   URINALYSIS CULTURE, IF INDICATED   Result Value Ref Range    Color Yellow     Character Clear     Specific Gravity 1.004 <1.035    Ph 5.0 5.0 - 8.0    Glucose Negative Negative mg/dL    Ketones Negative Negative mg/dL    Protein Negative Negative mg/dL    Bilirubin Negative Negative    Urobilinogen, Urine 0.2 Negative    Nitrite Negative Negative    Leukocyte Esterase Negative Negative    Occult Blood Negative Negative    Micro Urine Req see below    ESTIMATED GFR   Result Value Ref Range    GFR If African American >60 >60 mL/min/1.73 m 2    GFR If Non African American >60 >60 mL/min/1.73 m 2   POC BREATHALIZER   Result Value Ref Range    POC Breathalizer 0.07 (A) 0.00 - 0.01 Percent      COURSE & MEDICAL DECISION MAKING  Pertinent Labs & Imaging studies reviewed. (See chart for details)    Patient presents the emergency department for evaluation of depression and suicidal ideation.  He has polysubstance abuse  and he was recently here for similar complaints.    The patient was discharged from the emergency department without psychiatric authorization with a plan for outpatient follow-up.  He has not been compliant.    He continues to use alcohol and drugs and is now more depressed and suicidal.    The patient has been seen by [facility] will be placed on a psychiatric hold and transferred to inpatient psychiatric care.  I think this is reasonable since this is second visit.      Regarding his concern for kidney problems urinalysis is unremarkable metabolic panel is unremarkable.  Urine abnormality may be associate with the drugs she is using or may be psychiatric in nature.  I do not think requires any further work-up.  He stable pending transfer to a psychiatric facility      FINAL IMPRESSION  1. Polysubstance abuse (HCC)     2. Situational depression     3. Suicidal ideation         2.   3.         Electronically signed by: Dyllan Meza, 11/4/2019 9:59 AM

## 2019-11-05 NOTE — DISCHARGE PLANNING
Medical Social Work    LSW received call from [facility] ([facility]) stating the pt has been accepted by Dr. Bunch. PCS form and Facesheet faxed to [facility]. Transportation arranged w/ Marvin @ [facility] for 1730. LSW completed transfer packet and placed original LH in packet and placed on pt's chart. Bedside RN and [facility] notified of 1730 transport time.

## 2019-11-11 ENCOUNTER — HOSPITAL ENCOUNTER (EMERGENCY)
Facility: MEDICAL CENTER | Age: 39
End: 2019-11-11
Attending: EMERGENCY MEDICINE
Payer: COMMERCIAL

## 2019-11-11 ENCOUNTER — APPOINTMENT (OUTPATIENT)
Dept: RADIOLOGY | Facility: MEDICAL CENTER | Age: 39
End: 2019-11-11
Attending: EMERGENCY MEDICINE
Payer: COMMERCIAL

## 2019-11-11 VITALS
OXYGEN SATURATION: 98 % | DIASTOLIC BLOOD PRESSURE: 111 MMHG | RESPIRATION RATE: 18 BRPM | HEIGHT: 76 IN | SYSTOLIC BLOOD PRESSURE: 160 MMHG | WEIGHT: 251.32 LBS | BODY MASS INDEX: 30.6 KG/M2 | TEMPERATURE: 97.9 F | HEART RATE: 97 BPM

## 2019-11-11 DIAGNOSIS — F19.10 SUBSTANCE ABUSE (HCC): ICD-10-CM

## 2019-11-11 DIAGNOSIS — F43.21 SITUATIONAL DEPRESSION: ICD-10-CM

## 2019-11-11 DIAGNOSIS — F41.9 ANXIETY: ICD-10-CM

## 2019-11-11 LAB
ALBUMIN SERPL BCP-MCNC: 4.5 G/DL (ref 3.2–4.9)
ALBUMIN/GLOB SERPL: 1.9 G/DL
ALP SERPL-CCNC: 49 U/L (ref 30–99)
ALT SERPL-CCNC: 27 U/L (ref 2–50)
AMPHET UR QL SCN: NEGATIVE
ANION GAP SERPL CALC-SCNC: 10 MMOL/L (ref 0–11.9)
APPEARANCE UR: CLEAR
AST SERPL-CCNC: 32 U/L (ref 12–45)
BARBITURATES UR QL SCN: NEGATIVE
BASOPHILS # BLD AUTO: 1.8 % (ref 0–1.8)
BASOPHILS # BLD: 0.1 K/UL (ref 0–0.12)
BENZODIAZ UR QL SCN: NEGATIVE
BILIRUB SERPL-MCNC: 0.6 MG/DL (ref 0.1–1.5)
BILIRUB UR QL STRIP.AUTO: NEGATIVE
BUN SERPL-MCNC: 11 MG/DL (ref 8–22)
BZE UR QL SCN: POSITIVE
CALCIUM SERPL-MCNC: 8.9 MG/DL (ref 8.5–10.5)
CANNABINOIDS UR QL SCN: NEGATIVE
CHLORIDE SERPL-SCNC: 106 MMOL/L (ref 96–112)
CO2 SERPL-SCNC: 22 MMOL/L (ref 20–33)
COLOR UR: YELLOW
CREAT SERPL-MCNC: 0.84 MG/DL (ref 0.5–1.4)
EOSINOPHIL # BLD AUTO: 0.1 K/UL (ref 0–0.51)
EOSINOPHIL NFR BLD: 1.7 % (ref 0–6.9)
ERYTHROCYTE [DISTWIDTH] IN BLOOD BY AUTOMATED COUNT: 41.1 FL (ref 35.9–50)
GLOBULIN SER CALC-MCNC: 2.4 G/DL (ref 1.9–3.5)
GLUCOSE SERPL-MCNC: 108 MG/DL (ref 65–99)
GLUCOSE UR STRIP.AUTO-MCNC: NEGATIVE MG/DL
HCT VFR BLD AUTO: 45.9 % (ref 42–52)
HGB BLD-MCNC: 16.3 G/DL (ref 14–18)
KETONES UR STRIP.AUTO-MCNC: NEGATIVE MG/DL
LEUKOCYTE ESTERASE UR QL STRIP.AUTO: NEGATIVE
LYMPHOCYTES # BLD AUTO: 1.83 K/UL (ref 1–4.8)
LYMPHOCYTES NFR BLD: 31.6 % (ref 22–41)
MANUAL DIFF BLD: NORMAL
MCH RBC QN AUTO: 31.3 PG (ref 27–33)
MCHC RBC AUTO-ENTMCNC: 35.5 G/DL (ref 33.7–35.3)
MCV RBC AUTO: 88.1 FL (ref 81.4–97.8)
METHADONE UR QL SCN: NEGATIVE
MICRO URNS: NORMAL
MONOCYTES # BLD AUTO: 0.56 K/UL (ref 0–0.85)
MONOCYTES NFR BLD AUTO: 9.6 % (ref 0–13.4)
MORPHOLOGY BLD-IMP: NORMAL
MYELOCYTES NFR BLD MANUAL: 0.9 %
NEUTROPHILS # BLD AUTO: 3.16 K/UL (ref 1.82–7.42)
NEUTROPHILS NFR BLD: 53.5 % (ref 44–72)
NEUTS BAND NFR BLD MANUAL: 0.9 % (ref 0–10)
NITRITE UR QL STRIP.AUTO: NEGATIVE
NRBC # BLD AUTO: 0 K/UL
NRBC BLD-RTO: 0 /100 WBC
OPIATES UR QL SCN: NEGATIVE
OXYCODONE UR QL SCN: NEGATIVE
PCP UR QL SCN: NEGATIVE
PH UR STRIP.AUTO: 5 [PH] (ref 5–8)
PLATELET # BLD AUTO: 139 K/UL (ref 164–446)
PLATELET BLD QL SMEAR: NORMAL
PMV BLD AUTO: 10.1 FL (ref 9–12.9)
POC BREATHALIZER: 0.02 PERCENT (ref 0–0.01)
POTASSIUM SERPL-SCNC: 4.1 MMOL/L (ref 3.6–5.5)
PROPOXYPH UR QL SCN: NEGATIVE
PROT SERPL-MCNC: 6.9 G/DL (ref 6–8.2)
PROT UR QL STRIP: NEGATIVE MG/DL
RBC # BLD AUTO: 5.21 M/UL (ref 4.7–6.1)
RBC BLD AUTO: NORMAL
RBC UR QL AUTO: NEGATIVE
SODIUM SERPL-SCNC: 138 MMOL/L (ref 135–145)
SP GR UR STRIP.AUTO: 1.01
UROBILINOGEN UR STRIP.AUTO-MCNC: 0.2 MG/DL
WBC # BLD AUTO: 5.8 K/UL (ref 4.8–10.8)

## 2019-11-11 PROCEDURE — 700102 HCHG RX REV CODE 250 W/ 637 OVERRIDE(OP): Performed by: EMERGENCY MEDICINE

## 2019-11-11 PROCEDURE — 302970 POC BREATHALIZER: Performed by: EMERGENCY MEDICINE

## 2019-11-11 PROCEDURE — 80307 DRUG TEST PRSMV CHEM ANLYZR: CPT

## 2019-11-11 PROCEDURE — 99284 EMERGENCY DEPT VISIT MOD MDM: CPT

## 2019-11-11 PROCEDURE — 81003 URINALYSIS AUTO W/O SCOPE: CPT

## 2019-11-11 PROCEDURE — 85027 COMPLETE CBC AUTOMATED: CPT

## 2019-11-11 PROCEDURE — A9270 NON-COVERED ITEM OR SERVICE: HCPCS | Performed by: EMERGENCY MEDICINE

## 2019-11-11 PROCEDURE — 90791 PSYCH DIAGNOSTIC EVALUATION: CPT

## 2019-11-11 PROCEDURE — 93971 EXTREMITY STUDY: CPT | Mod: RT

## 2019-11-11 PROCEDURE — 93971 EXTREMITY STUDY: CPT | Mod: 26,RT | Performed by: INTERNAL MEDICINE

## 2019-11-11 PROCEDURE — 80053 COMPREHEN METABOLIC PANEL: CPT

## 2019-11-11 PROCEDURE — 85007 BL SMEAR W/DIFF WBC COUNT: CPT

## 2019-11-11 PROCEDURE — 302970 POC BREATHALIZER

## 2019-11-11 RX ORDER — LORAZEPAM 1 MG/1
1 TABLET ORAL ONCE
Status: COMPLETED | OUTPATIENT
Start: 2019-11-11 | End: 2019-11-11

## 2019-11-11 RX ADMIN — LORAZEPAM 1 MG: 1 TABLET ORAL at 13:40

## 2019-11-11 NOTE — DISCHARGE PLANNING
Alert Team  Noted consult order.  Pt will need:  -medical clearance per the ERP  -legal sobriety noted in Epic labs.    Once pt is ready for consult, bedside RN to call AT for consult.  Please attempt to have UDS sent prior to consult.

## 2019-11-11 NOTE — ED PROVIDER NOTES
ED Provider Note    CHIEF COMPLAINT  Chief Complaint   Patient presents with   • Suicidal Ideation     Was d/c from [facility] 2 days ago.         HPI  Rigoberto Mckeon is a 39 y.o. male who presents to the emergency department complaining of anxiety.  He states he is is so unsure that he will die from a chronic disease that he is very anxious and nervous and cannot get rest.  He was seen and evaluated here recently had a metabolic and laboratory work-up which is unremarkable and sent to psychiatric facility because he was so concerned about this he wanted to end his life.  He received a diagnosis of bipolar disorder which she does not agree with.  Is not taking any medications.  Since leaving he continues to drink alcohol.  He is no longer abusing cocaine.  He states that he still has the same feelings.  He is just obsessed with the fact that something is wrong medically.  States his urine is unusually foamy.  Denies any dysuria hematuria.  Is very anxious.  Also has noticed some bruising in the medial aspect of the right thigh is not sure without this problem.  Initially he stated to me that he would Inderal if he could not get an answer figure this out.  But on further questioning he denies being suicidal.  States he is on subcutaneous anxiety under control.    REVIEW OF SYSTEMS  See HPI for further details. All other systems are negative.    PAST MEDICAL HISTORY  Past Medical History:   Diagnosis Date   • Hypertension    • Psychiatric disorder     anxiety       FAMILY HISTORY  No family history on file.    SOCIAL HISTORY  Social History     Socioeconomic History   • Marital status: Single     Spouse name: Not on file   • Number of children: Not on file   • Years of education: Not on file   • Highest education level: Not on file   Occupational History   • Not on file   Social Needs   • Financial resource strain: Not on file   • Food insecurity:     Worry: Not on file     Inability: Not on file   •  "Transportation needs:     Medical: Not on file     Non-medical: Not on file   Tobacco Use   • Smoking status: Never Smoker   • Smokeless tobacco: Never Used   Substance and Sexual Activity   • Alcohol use: Yes     Alcohol/week: 12.0 oz     Types: 20 Cans of beer per week     Frequency: 4 or more times a week     Drinks per session: 10 or more   • Drug use: Yes     Comment: marijuana, cocaine   • Sexual activity: Not on file   Lifestyle   • Physical activity:     Days per week: Not on file     Minutes per session: Not on file   • Stress: Not on file   Relationships   • Social connections:     Talks on phone: Not on file     Gets together: Not on file     Attends Congregation service: Not on file     Active member of club or organization: Not on file     Attends meetings of clubs or organizations: Not on file     Relationship status: Not on file   • Intimate partner violence:     Fear of current or ex partner: Not on file     Emotionally abused: Not on file     Physically abused: Not on file     Forced sexual activity: Not on file   Other Topics Concern   • Not on file   Social History Narrative   • Not on file       SURGICAL HISTORY  No past surgical history on file.    CURRENT MEDICATIONS  Home Medications    **Home medications have not yet been reviewed for this encounter**     Currently taking no medications per    ALLERGIES  No Known Allergies    PHYSICAL EXAM  VITAL SIGNS: BP (!) 162/115 Comment: second pressure 158 110  Pulse 98   Temp 36.6 °C (97.9 °F) (Temporal)   Resp 14   Ht 1.93 m (6' 4\")   Wt 114 kg (251 lb 5.2 oz)   SpO2 95%   BMI 30.59 kg/m²    Constitutional: Awake alert sitting up anxious, slightly pressured speech.  No acute court-appointed distress  HENT: Normocephalic, Atraumatic, Bilateral external ears normal, Oropharynx moist, No oral exudates, Nose normal.   Eyes: PERRL, EOMI, Conjunctiva normal, No discharge.     Cardiovascular: Normal heart rate, Normal rhythm, No murmurs, No rubs, No " gallops.   Thorax & Lungs: Normal breath sounds, No respiratory distress, No wheezing,   Abdomen: Bowel sounds normal, Soft, No tenderness,s.   Skin: Warm, Dry, No erythema, No rash.   Musculoskeletal: Good range of motion in all major joints.  Ecchymosis and prominent but not palpable veins in the medial aspect of the right thigh.  Normal pulses.  No swelling in distal extremities  Neurologic: Alert,No focal deficits noted.   Psychiatric: Affect slightly anxious no suicidal ideation per      Results for orders placed or performed during the hospital encounter of 11/11/19   CBC WITH DIFFERENTIAL   Result Value Ref Range    WBC 5.8 4.8 - 10.8 K/uL    RBC 5.21 4.70 - 6.10 M/uL    Hemoglobin 16.3 14.0 - 18.0 g/dL    Hematocrit 45.9 42.0 - 52.0 %    MCV 88.1 81.4 - 97.8 fL    MCH 31.3 27.0 - 33.0 pg    MCHC 35.5 (H) 33.7 - 35.3 g/dL    RDW 41.1 35.9 - 50.0 fL    Platelet Count 139 (L) 164 - 446 K/uL    MPV 10.1 9.0 - 12.9 fL    Neutrophils-Polys 53.50 44.00 - 72.00 %    Lymphocytes 31.60 22.00 - 41.00 %    Monocytes 9.60 0.00 - 13.40 %    Eosinophils 1.70 0.00 - 6.90 %    Basophils 1.80 0.00 - 1.80 %    Nucleated RBC 0.00 /100 WBC    Neutrophils (Absolute) 3.16 1.82 - 7.42 K/uL    Lymphs (Absolute) 1.83 1.00 - 4.80 K/uL    Monos (Absolute) 0.56 0.00 - 0.85 K/uL    Eos (Absolute) 0.10 0.00 - 0.51 K/uL    Baso (Absolute) 0.10 0.00 - 0.12 K/uL    NRBC (Absolute) 0.00 K/uL   COMP METABOLIC PANEL   Result Value Ref Range    Sodium 138 135 - 145 mmol/L    Potassium 4.1 3.6 - 5.5 mmol/L    Chloride 106 96 - 112 mmol/L    Co2 22 20 - 33 mmol/L    Anion Gap 10.0 0.0 - 11.9    Glucose 108 (H) 65 - 99 mg/dL    Bun 11 8 - 22 mg/dL    Creatinine 0.84 0.50 - 1.40 mg/dL    Calcium 8.9 8.5 - 10.5 mg/dL    AST(SGOT) 32 12 - 45 U/L    ALT(SGPT) 27 2 - 50 U/L    Alkaline Phosphatase 49 30 - 99 U/L    Total Bilirubin 0.6 0.1 - 1.5 mg/dL    Albumin 4.5 3.2 - 4.9 g/dL    Total Protein 6.9 6.0 - 8.2 g/dL    Globulin 2.4 1.9 - 3.5 g/dL    A-G  Ratio 1.9 g/dL   URINALYSIS,CULTURE IF INDICATED   Result Value Ref Range    Color Yellow     Character Clear     Specific Gravity 1.011 <1.035    Ph 5.0 5.0 - 8.0    Glucose Negative Negative mg/dL    Ketones Negative Negative mg/dL    Protein Negative Negative mg/dL    Bilirubin Negative Negative    Urobilinogen, Urine 0.2 Negative    Nitrite Negative Negative    Leukocyte Esterase Negative Negative    Occult Blood Negative Negative    Micro Urine Req see below    ESTIMATED GFR   Result Value Ref Range    GFR If African American >60 >60 mL/min/1.73 m 2    GFR If Non African American >60 >60 mL/min/1.73 m 2   DIFFERENTIAL MANUAL   Result Value Ref Range    Bands-Stabs 0.90 0.00 - 10.00 %    Myelocytes 0.90 %    Manual Diff Status PERFORMED    PERIPHERAL SMEAR REVIEW   Result Value Ref Range    Peripheral Smear Review see below    PLATELET ESTIMATE   Result Value Ref Range    Plt Estimation Decreased    MORPHOLOGY   Result Value Ref Range    RBC Morphology Normal    POC BREATHALIZER   Result Value Ref Range    POC Breathalizer 0.019 (A) 0.00 - 0.01 Percent      COURSE & MEDICAL DECISION MAKING  Pertinent Labs & Imaging studies reviewed. (See chart for details)    Patient presents emergency department with anxiety depression and thoughts that he has some terminal illness.  Only real complaint is occasional foamy urine.  He denies any chest pain or tightness or shortness of breath.  Does not suggest ACS.  His abdominal pain nausea vomiting or weight loss.  Really has nothing focal to complain about.  I did some basic labs.  I reviewed his previous chart for basic labs for comparison and these are unchanged.      The patient made some suicidal comments therefore he seen by [facility]. Ines from [facility]  does not feel he is a danger to himself.      The patient does not have any sniffing laboratory abnormalities.  Ultrasound is unremarkable.  Urinalysis is unremarkable he still has a little bit of alcohol in his  system.     At this point the patient can be discharged home he will be [facility] primary care follow-up for primary care doctor.  Denies being suicidal.  She is to avoid alcohol and drugs.  Questions are answered.  Is agreeable to plan        The patient was noted to have elevated blood pressure while in the ER and was counseled to see their doctor within one wee to have this rechecked.    FINAL IMPRESSION  1. Situational depression     2. Anxiety     3. Substance abuse (HCC)         2.   3.         Electronically signed by: Dyllan Meza, 11/11/2019 1:23 PM

## 2019-11-11 NOTE — CONSULTS
"[facility]   TRIAGE ASSESSMENT    Name: Rigoberto Mcekon  MRN: [id number]  : 1980  Age: 39 y.o.  Date of assessment: 2019  PCP: Sendy Bermudez M.D.  Persons in attendance: Patient    CHIEF COMPLAINT/PRESENTING ISSUE (as stated by pt): Pt reports he was DC'd from [facility] 2 days ago.  He was sent there, from here, 19 for SI.  He reports he has been \"self medicating\" with alcohol since release.  Alert and oriented x4; denies HI and hallucinations and is legally able to care for self.  Pt with pressured, monopolizing speech.  Reports he doesn't agree with the diagnosis of Bipolar d/o he was given during his recent inpt stay there.  \"I am not a psychiatrist, but I do have a degree in psychology.  I really don't feel like I have bipolar.\" (Of note, this is a recurring theme for this pt.  He has also previously expressed disagreement with the dx of his community psychiatrist, Dr Shelby, who also has tried to place this pt on antipsychotics.)  He has not been taking the depakote and zyprexa he reports they prescribed him at [place].  He feels he was discharged too soon, saying \"It's partly my fault.  I wasn't honest with them over there,\" and stated he told them he was no longer suicidal.  When I asked him about being suicidal today, he says, \"In all honesty, no.  I don't want to kill myself.  I'm just really really anxious and feel like no one is doing anything to help me.\"  He requests \"something to help me calm down;\" request relayed to ERP.  He contracts for safety and I advised he return immediately if he cannot keep safe.  Chief Complaint   Patient presents with   • Suicidal Ideation     Was d/c from [place] [facility] 2 days ago.          CURRENT LIVING SITUATION/SOCIAL SUPPORT:Pt lives in [place], works FT for the [employer] of [place] at [employer].  He is an only child and his father is . Notes that he has been experiencing a lot of job stress recently, as well as multiple medical issues he feels are " "unresolved. He has some friends in [place] and claims he has an adequate social support system.     This is pt's 7th ER visit for the same/similar complaints in the last 7 weeks; 5th Alert Team assessment.    The last three encounters he has escalated to c/o SI in addition to severe anxiety.  [facility]  Today, he denies any plan or intent and contracts for safety.  This contradicts what he reported in triage, so MONIQUE Torres and I spoke with pt together to gain assurance that pt is actually NOT suicidal.  He continued to deny SI, intent or plan.       BEHAVIORAL HEALTH TREATMENT HISTORY  Does patient/parent report a history of prior behavioral health treatment for patient?   Yes:    Dates Level of Care Facilty/Provider Diagnosis/Problem Medications   9/2019 outpt x2 visits Dr Shelby, psychiatry Anx zyprexa-pt reports he only took it for 5 days.  Says it made him have a tremor and dizziness, so stopped taking.  Pt doesn't agree with Dr Shelby's use of an antipsychotic and would like to try a new psychiatrist.                                                                                                                  When here 10/28 of this year, I made him a f/u appt at [facility], but he no call/no showed the appointment and was back in the ER the following day.  When I asked him about it, he apologized and said \"I have no good excuse.\"    Received records from [facility].  He was there 11/4-11/9/19.  His official dx there was Bipolar d/o, current episode manic severe with psychotic features (F31.2) per Dr Bunch, psychiatrist.  He was scheduled to f/u with his previously established psychiatrist, Dr Shelby, on 12/5/19.  He was referred to psychology; no f/u appt set up, as pt expressed wanting to set it up himself.  He was given prescriptions for zyprexa and depakote upon DC; described as \"pleasant and cooperative, denying SI\" at DC from [facility].        SAFETY ASSESSMENT - SELF  Does patient " "acknowledge current or past symptoms of dangerousness to self? yes  Does parent/significant other report patient has current or past symptoms of dangerousness to self? N\A  Does presenting problem suggest symptoms of dangerousness to self? No  Pt acknowledges past SI.  He has no hx of attempts.    SAFETY ASSESSMENT - OTHERS  Does patient acknowledge current or past symptoms of aggressive behavior or risk to others? no  Does parent/significant other report patient has current or past symptoms of aggressive behavior or risk to others?  N\A  Does presenting problem suggest symptoms of dangerousness to others? No    Crisis Safety Plan completed and copy given to patient? N\A    ABUSE/NEGLECT SCREENING  Does patient report feeling “unsafe” in his/her home, or afraid of anyone?  no  Does patient report any history of physical, sexual, or emotional abuse?  no  Does parent or significant other report any of the above? N\A  Is there evidence of neglect by self?  no  Is there evidence of neglect by a caregiver? no  Does the patient/parent report any history of CPS/APS/police involvement related to suspected abuse/neglect or domestic violence? no  Based on the information provided during the current assessment, is a mandated report of suspected abuse/neglect being made?  No    SUBSTANCE USE SCREENING  Yes:  Jaylen all substances used in the past 30 days: Pt admits to recent etoh and cocaine use.      Last Use Amount   [x]   Alcohol     []   Marijuana     []   Heroin     []   Prescription Opioids  (used without prescription, for    recreation, or in excess of prescribed amount)     []   Other Prescription  (used without prescription, for    recreation, or in excess of prescribed amount)     [x]   Cocaine      []   Methamphetamine     []   \"\" drugs (ectasy, MDMA)     []   Other substances        UDS results: pending  Breathalyzer results: 0.0  What consequences does the patient associate with any of the above substance use " "and or addictive behaviors? Other: pt acknowledges he is self medicating and \"I know it isn't helping.  It's the only thing that works, but then I feel worse.\"    Risk factors for detox (check all that apply):  []  Seizures   []  Diaphoretic (sweating)   []  Tremors   []  Hallucinations   []  Increased blood pressure   []  Decreased blood pressure   []  Other   [x]  None      [x] Patient education on risk factors for detoxification and instructed to return to ER as needed.      MENTAL STATUS   Participation: Verbally monopolizing  Grooming: Casual  Orientation: Alert and Fully Oriented  Behavior: Tense  Eye contact: Good  Mood: Anxious and Manic  Affect: Anxious  Thought process: Logical, Goal-directed and Tangential  Thought content: Paranoia and he self reports feeling paranoid \"constantly\" about having a physical ailment \"that is going to kill me.\"  Speech: Volume within normal limits and Pressured  Perception: Within normal limits  Memory:  No gross evidence of memory deficits  Insight: Limited  Judgment:  Adequate  Other:    Collateral information: past visits  Source:  [] Significant other present in person:   [] Significant other by telephone  [] [facility]   [x] [facility]  [] [facility]  [] Other:     [] Unable to complete full assessment due to:  [] Acute intoxication  [] Patient declined to participate/engage  [] Patient verbally unresponsive  [] Significant cognitive deficits  [] Significant perceptual distortions or behavioral disorganization  [] Other:      CLINICAL IMPRESSIONS:  Primary:  Anxiety  Secondary:  Polysubstance use d/oa       IDENTIFIED NEEDS/PLAN:  [Trigger DISPOSITION list for any items marked]    []  Imminent safety risk - self [] Imminent safety risk - others   []  Acute substance withdrawal []  Psychosis/Impaired reality testing   [x]  Mood/anxiety [x]  Substance use/Addictive behavior   [x]  Maladaptive behaviro []  Parent/child conflict   []  " Family/Couples conflict []  Biomedical   []  Housing []  Financial   []   Legal  Occupational/Educational   []  Domestic violence []  Other:     Disposition: Refer to [facility], [facility] or     Does patient express agreement with the above plan? yes    Referral appointment(s) scheduled? N\A    Alert team only: Pt does not qualify for a legal hold.  He denies any concrete SI to me, including plan and intent, and contracts for safety.  I advised him to return to ER immediately if cannot stay safe/feeling suicidal.  I have advised him that he is welcome to go to either [facility] or  and ask to be admitted voluntarily.  He expressed aversion to returning to [facility], so I advised going to .  I advised he call [facility] office and request another appt, (he no call/no showed the last emergency appointment we made for him, so he will need to go about making an appointment through the normal channels.)  I spent time discussing with him that, if he gets into another psychiatrist and they too dx him with Bipolar, he might want to consider the validity of that third opinion.    I have discussed findings and recommendations with Dr. Torres, who is in agreement with these recommendations.       Ines Calderon R.N.  11/11/2019

## 2019-11-11 NOTE — ED TRIAGE NOTES
"Pt ambs to triage  Chief Complaint   Patient presents with   • Suicidal Ideation     Was d/c from [facility] 2 days ago.     States he doesn't know what's going on with him, reports that he is sick and no one is addressing his \"medical issues\"  \"I can't do this anymore, I need to be put away somewhere and get straighten out or Im going to just be done with it\".          "

## 2019-11-11 NOTE — DISCHARGE INSTRUCTIONS
Follow-up with primary care.  Return to emerge part for any new medical problems or complaints.  Avoid alcohol and drugs.  Return to the emergency room or call 911 for thoughts of hurting yourself or others.

## 2019-11-12 ENCOUNTER — HOSPITAL ENCOUNTER (EMERGENCY)
Facility: MEDICAL CENTER | Age: 39
End: 2019-11-12
Attending: EMERGENCY MEDICINE
Payer: COMMERCIAL

## 2019-11-12 ENCOUNTER — HOSPITAL ENCOUNTER (INPATIENT)
Dept: HOSPITAL 8 - 3E | Age: 39
LOS: 4 days | Discharge: HOME | DRG: 885 | End: 2019-11-16
Attending: PSYCHIATRY & NEUROLOGY | Admitting: PSYCHIATRY & NEUROLOGY
Payer: COMMERCIAL

## 2019-11-12 VITALS — HEIGHT: 76 IN | BODY MASS INDEX: 29.91 KG/M2 | WEIGHT: 245.59 LBS

## 2019-11-12 VITALS
WEIGHT: 253.31 LBS | OXYGEN SATURATION: 97 % | SYSTOLIC BLOOD PRESSURE: 144 MMHG | HEART RATE: 90 BPM | HEIGHT: 76 IN | TEMPERATURE: 97.8 F | BODY MASS INDEX: 30.85 KG/M2 | DIASTOLIC BLOOD PRESSURE: 99 MMHG | RESPIRATION RATE: 18 BRPM

## 2019-11-12 VITALS — DIASTOLIC BLOOD PRESSURE: 84 MMHG | SYSTOLIC BLOOD PRESSURE: 132 MMHG

## 2019-11-12 VITALS — SYSTOLIC BLOOD PRESSURE: 168 MMHG | DIASTOLIC BLOOD PRESSURE: 124 MMHG
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
## 2019-11-12 DIAGNOSIS — I10: ICD-10-CM

## 2019-11-12 DIAGNOSIS — F31.9: Primary | ICD-10-CM

## 2019-11-12 DIAGNOSIS — R45.851: ICD-10-CM

## 2019-11-12 DIAGNOSIS — R45.851 SUICIDAL IDEATION: Primary | ICD-10-CM

## 2019-11-12 DIAGNOSIS — Z82.49: ICD-10-CM

## 2019-11-12 DIAGNOSIS — F41.0: ICD-10-CM

## 2019-11-12 DIAGNOSIS — F10.10: ICD-10-CM

## 2019-11-12 DIAGNOSIS — F41.9 ANXIETY: ICD-10-CM

## 2019-11-12 LAB
AMPHET UR QL SCN: NEGATIVE
BARBITURATES UR QL SCN: NEGATIVE
BENZODIAZ UR QL SCN: NEGATIVE
BZE UR QL SCN: POSITIVE
CANNABINOIDS UR QL SCN: NEGATIVE
CULTURE INDICATED?: YES
METHADONE UR QL SCN: NEGATIVE
MICROSCOPIC: (no result)
OPIATES UR QL SCN: NEGATIVE
OXYCODONE UR QL SCN: NEGATIVE
PCP UR QL SCN: NEGATIVE
POC BREATHALIZER: 0 PERCENT (ref 0–0.01)
PROPOXYPH UR QL SCN: NEGATIVE

## 2019-11-12 PROCEDURE — 84439 ASSAY OF FREE THYROXINE: CPT

## 2019-11-12 PROCEDURE — 302970 POC BREATHALIZER

## 2019-11-12 PROCEDURE — 80053 COMPREHEN METABOLIC PANEL: CPT

## 2019-11-12 PROCEDURE — 87086 URINE CULTURE/COLONY COUNT: CPT

## 2019-11-12 PROCEDURE — 82140 ASSAY OF AMMONIA: CPT

## 2019-11-12 PROCEDURE — 99285 EMERGENCY DEPT VISIT HI MDM: CPT

## 2019-11-12 PROCEDURE — 36415 COLL VENOUS BLD VENIPUNCTURE: CPT

## 2019-11-12 PROCEDURE — 84443 ASSAY THYROID STIM HORMONE: CPT

## 2019-11-12 PROCEDURE — 93005 ELECTROCARDIOGRAM TRACING: CPT

## 2019-11-12 PROCEDURE — 71045 X-RAY EXAM CHEST 1 VIEW: CPT

## 2019-11-12 PROCEDURE — 80061 LIPID PANEL: CPT

## 2019-11-12 PROCEDURE — 302970 POC BREATHALIZER: Performed by: EMERGENCY MEDICINE

## 2019-11-12 PROCEDURE — 700102 HCHG RX REV CODE 250 W/ 637 OVERRIDE(OP): Performed by: EMERGENCY MEDICINE

## 2019-11-12 PROCEDURE — 81001 URINALYSIS AUTO W/SCOPE: CPT

## 2019-11-12 PROCEDURE — 80307 DRUG TEST PRSMV CHEM ANLYZR: CPT

## 2019-11-12 PROCEDURE — A9270 NON-COVERED ITEM OR SERVICE: HCPCS | Performed by: EMERGENCY MEDICINE

## 2019-11-12 PROCEDURE — 85025 COMPLETE CBC W/AUTO DIFF WBC: CPT

## 2019-11-12 RX ORDER — LORAZEPAM 1 MG/1
2 TABLET ORAL ONCE
Status: COMPLETED | OUTPATIENT
Start: 2019-11-12 | End: 2019-11-12

## 2019-11-12 RX ADMIN — LORAZEPAM 2 MG: 1 TABLET ORAL at 12:58

## 2019-11-12 NOTE — ED TRIAGE NOTES
"Chief Complaint   Patient presents with   • Suicidal Ideation     Pt presents to the ER today asking for help; states he is suicidal; states he is parinoid and thinks he is having a huge mental breakdown; is unsure of what is real and what is not; states he feels like \"he is dying\"   • Anxiety     Pt roomed immediately. Pt states he if is dc he \"doesn't know what he will do. Pt states his plan is to slit his wrists or jump off a parking garage. Pt states he has been drinking lately and making 'poor choices\".    "

## 2019-11-12 NOTE — DISCHARGE PLANNING
LETICIA received a call from Darron at [facility] who reported that they are reviewing the paperwork and will likely be able to take the pt.  He requested a nurse to nurse report [phone].    AVELINAW called ER MARCO ANTONIO Bernard and gave her the report number.

## 2019-11-12 NOTE — ED NOTES
Med rec updated and complete  Allergies reviewed  Pt reports that when he left the [facility] on 11/1/2019, he did not fill his ZYPREXA or DEPAKOTE.     Pt reports no OTC's or vitamins.  Pt reports no antibiotics in the last 2 weeks

## 2019-11-12 NOTE — CONSULTS
"[facility]   TRIAGE ASSESSMENT  Via Telemed to [facility]    Name: Rigoberto Mckeon  MRN: [id number]  : 1980  Age: 39 y.o.  Date of assessment: 2019  PCP: Sendy Bermudez M.D.  Persons in attendance: Patient    CHIEF COMPLAINT/PRESENTING ISSUE (as stated by pt): Pt presented himself to [facility] ER today with c/o SI and anxiety.  Pt perseverating on dying and physical ailments and having a \"huge mental breakdown.\"  Reported SI with plan to \"slit his wrists or jump off a parking garage.\"  Pt continues to drink and use cocaine between ER and psych hospital visits.  I saw this pt yesterday at our [facility] ER, when he presented with SI and later recanted, tramaine for safety. DC'd him to self with plan to f/u outpt with a new psychiatrist.  When I saw pt today, he displays continued fixation on physical ailments that worry him he might die, despite being medically cleared several times by our ERPs.  He reports he went to [facility] after leaving [facility] yesterday and got enrolled in their IOP program.  He reports he was there this morning, attending a session, when he panicked about his DC instructions from the previous day's ER visit.  \"It said to f/u with my PCP immediately.  I called him today and couldn't get in, so I panicked and came to the ER.\"  Upon arrival to the ER, pt reported SI with plan.  He was given PRN Ativan, which seemed to calm him some, but he was still pressured, tangential and verbally monopolizing for during the entire time on the telemed.  I ended up having to hang up on him as he was unable to stop talking, despite several polite attempts on my part to end the call.  Once I told him he would be going to inpatient treatment, he recanted his previously voiced SI, saying \"I just need to get these medical issues worked out.  I'm not suicidal.  I was just having a panic attack.\"  Chief Complaint   Patient presents with   • Suicidal Ideation     Pt presents to the ER today asking for help; " "states he is suicidal; states he is parinoid and thinks he is having a huge mental breakdown; is unsure of what is real and what is not; states he feels like \"he is dying\"   • Anxiety        CURRENT LIVING SITUATION/SOCIAL SUPPORT: Pt has housing and employment.  He is a  at [employer] and, per him, has a psychology degree.  He is an only child and his father is . Notes that he has been experiencing a lot of job stress recently, as well as multiple medical issues he feels are unresolved. He has some friends in [place] and claims he has an adequate social support system.     This is pt's 8th ER visit for the same/similar complaints in the last 7 weeks; 6th Alert Team assessment.    The last four encounters he has escalated to c/o SI in addition to severe anxiety.  Last time we sent him to inpt tx.      This is my 4th assessment with this patient since 10/28/19.  I have become familiar with his current pattern of voicing SI, then getting Ativan in the ER and recanting his SI.  His anxiety appears to be worsening and he seems quite manic to me.  Upon leaving yesterday, I spoke frankly with him about being diagnosed with bipolar d/o by two different psychiatrists, and his continues disagreement with their diagnosis.  Pt continues to feel he does not have bipolar d/o, despite having manic and psychotic features to his current \"anxiety.\"    (This is part of my note from yesterday, when he was DC'd to self to f/u as an outpt:  \"Pt reports he was DC'd from [facility] 2 days ago.  He was sent there, from here, 19 for SI.  He reports he has been \"self medicating\" with alcohol since release.  Pt with pressured, monopolizing speech.  Reports he doesn't agree with the diagnosis of Bipolar d/o he was given during his recent inpt stay there.  \"I am not a psychiatrist, but I do have a degree in psychology.  I really don't feel like I have bipolar.\" (Of note, this is a recurring theme for this pt.  He has also previously " "expressed disagreement with the dx of his community psychiatrist, Dr Shelby, who also has tried to place this pt on antipsychotics.)  He has not been taking the depakote and zyprexa he reports they prescribed him at DC.  He feels he was discharged too soon, saying \"It's partly my fault.  I wasn't honest with them over there,\" and stated he told them he was no longer suicidal.  When I asked him about being suicidal today, he says, \"In all honesty, no.  I don't want to kill myself.  I'm just really really anxious and feel like no one is doing anything to help me.\"  He requests \"something to help me calm down;\" request relayed to ERP.  He contracts for safety and I advised he return immediately if he cannot keep safe.\")      BEHAVIORAL HEALTH TREATMENT HISTORY  Does patient/parent report a history of prior behavioral health treatment for patient?   Yes:    Dates Level of Care Facilty/Provider Diagnosis/Problem Medications   11/2019 inpt [facility] SI/anx Depakote, zyprexa, but he isn't taking                        9/2019 outpt x2 visits Dr Afsaneh kaufmanyprexa-pt reports he only took it for 5 days.  Says it made him have a tremor and dizziness, so stopped taking.  Pt doesn't agree with Dr Shelby's use of an antipsychotic and would like to try a new psychiatrist.                                        While here yesterday, I contacted [facility] for records from recent admission:  Received records from [facility].  He was there 11/4-11/9/19.  His official dx there was Bipolar d/o, current episode manic severe with psychotic features (F31.2) per Dr Bunch, psychiatrist.  He was scheduled to f/u with his previously established psychiatrist, Dr Shelby, on 12/5/19.  He was referred to psychology; no f/u appt set up, as pt expressed wanting to set it up himself.  He was given prescriptions for zyprexa and depakote upon DC; described as \"pleasant and cooperative, denying SI\" at DC from [facility].        SAFETY ASSESSMENT - SELF  Does patient " acknowledge current or past symptoms of dangerousness to self? yes  Does parent/significant other report patient has current or past symptoms of dangerousness to self? N\A  Does presenting problem suggest symptoms of dangerousness to self? Yes:     Past Current    Suicidal Thoughts: [x]  [x]    Suicidal Plans: [x]  [x]    Suicidal Intent: []  []    Suicide Attempts: []  []    Self-Injury []  []      For any boxes checked above, provide detail: Pt has past and current SI with plan.    History of suicide by family member: no  History of suicide by friend/significant other: no  Recent change in frequency/specificity/intensity of suicidal thoughts or self-harm behavior? yes - increased in the past few weeks.    Current access to firearms, medications, or other identified means of suicide/self-harm? No; LH in ER.  If yes, willing to restrict access to means of suicide/self-harm? no  Protective factors present:  Positive self-efficacy and Willing to address in treatment    SAFETY ASSESSMENT - OTHERS  Does patient acknowledge current or past symptoms of aggressive behavior or risk to others? no  Does parent/significant other report patient has current or past symptoms of aggressive behavior or risk to others?  N\A  Does presenting problem suggest symptoms of dangerousness to others? No    Crisis Safety Plan completed and copy given to patient? N\A    ABUSE/NEGLECT SCREENING  Does patient report feeling “unsafe” in his/her home, or afraid of anyone?  no  Does patient report any history of physical, sexual, or emotional abuse?  no  Does parent or significant other report any of the above? N\A  Is there evidence of neglect by self?  no  Is there evidence of neglect by a caregiver? no  Does the patient/parent report any history of CPS/APS/police involvement related to suspected abuse/neglect or domestic violence? no  Based on the information provided during the current assessment, is a mandated report of suspected  "abuse/neglect being made?  No    SUBSTANCE USE SCREENING  Yes:  Jaylen all substances used in the past 30 days:      Last Use Amount   [x]   Alcohol     []   Marijuana     []   Heroin     []   Prescription Opioids  (used without prescription, for    recreation, or in excess of prescribed amount)     []   Other Prescription  (used without prescription, for    recreation, or in excess of prescribed amount)     [x]   Cocaine      []   Methamphetamine     []   \"\" drugs (ectasy, MDMA)     []   Other substances        UDS results: +cocaine  Breathalyzer results: 0.0    What consequences does the patient associate with any of the above substance use and or addictive behaviors? Health problems: \"I am self medicating.\"    Risk factors for detox (check all that apply):  []  Seizures   []  Diaphoretic (sweating)   []  Tremors   []  Hallucinations   []  Increased blood pressure   []  Decreased blood pressure   []  Other   []  None      [] Patient education on risk factors for detoxification and instructed to return to ER as needed.      MENTAL STATUS   Participation: Verbally monopolizing and Defensive  Grooming: Casual  Orientation: Alert and Fully Oriented  Behavior: Tense  Eye contact: Good  Mood: Anxious and Manic  Affect: Anxious  Thought process: Tangential and Perseveration  Thought content: Within normal limits  Speech: Loud, Pressured and Hypertalkative  Perception: Within normal limits  Memory:  No gross evidence of memory deficits  Insight: Limited  Judgment:  Limited  Other:    Collateral information: past visits  Source:  [] Significant other present in person:   [] Significant other by telephone  [] [facility]   [] [facility] Nursing [facility]  [x] [facility]  [] Other:     [] Unable to complete full assessment due to:  [] Acute intoxication  [] Patient declined to participate/engage  [] Patient verbally unresponsive  [] Significant cognitive deficits  [] Significant perceptual distortions or " behavioral disorganization  [] Other:      CLINICAL IMPRESSIONS:  Primary:  SI  Secondary:  Anxiety/Panic       IDENTIFIED NEEDS/PLAN:  [Trigger DISPOSITION list for any items marked]    [x]  Imminent safety risk - self [] Imminent safety risk - others   []  Acute substance withdrawal [x]  Psychosis/Impaired reality testing   [x]  Mood/anxiety [x]  Substance use/Addictive behavior   [x]  Maladaptive behaviro []  Parent/child conflict   []  Family/Couples conflict []  Biomedical   []  Housing []  Financial   []   Legal  Occupational/Educational   []  Domestic violence []  Other:     Disposition: Actively being addressed by [facility] and [facility]    Does patient express agreement with the above plan? yes    Referral appointment(s) scheduled? N\A    Alert team only: Pt to remain on LH for SI.  Pt has decompensated since just yesterday and seems to be potentially dangerous to self, with possibly some psychotic features AEB his continued perseveration on medical illness he does not have.  I have discussed findings and recommendations with Dr. Bryant, who is in agreement with these recommendations.     Referral information sent to the following community providers : per ER staff at [facility].      Ines Calderon R.N.  11/12/2019

## 2019-11-12 NOTE — ED NOTES
"Rounded on patient.  Highly anxious person who presents stating \"dont know if I'm going to die.  Like do I only have a couple years left?  Did I do too much damage to my body when I did drugs.\"  He reports continued use of drugs and alcohol but doesn't want to do that anymore.  States he was also at an impatient facility and was discharged without anxiety medications and was really paranoid.  Very nervous about his discharge paperwork stating \"see your primary physician immediately.\"  "

## 2019-11-12 NOTE — ED PROVIDER NOTES
"ED Provider Note    CHIEF COMPLAINT  Chief Complaint   Patient presents with   • Suicidal Ideation     Pt presents to the ER today asking for help; states he is suicidal; states he is parinoid and thinks he is having a huge mental breakdown; is unsure of what is real and what is not; states he feels like \"he is dying\"   • Anxiety       HPI  Rigoberto Mckeon is a 39 y.o. male who presents to the emergency department through triage for suicidal ideation.  Patient states he feels as though \"I am either having a mental breakdown I am going to die soon.\"  Patient states he feeling suicidal \"if I am going to die soon and -1 just get over with now.\"  Patient is concerned that \"I have a deadly disease, my eyes are sunken, my legs might be swollen, my fingers change colors...\" Patient admits to consecutive days of cocaine use and alcohol intoxication over the weekend \"I am never going to do that again because I think that is how he got here now.\"  Patient admits to history of anxiety and OCD, previously diagnosed with bipolar but does not believe this diagnosis and does not take medication for it.  Compliant only with trazodone.    Patient states he was seen yesterday at the [facility] and discharged after a thorough work-up \"but my paperwork said to follow-up with my primary care provider immediately and this must mean and going to die so than my with a semi-home?\"    Suicidal, with plan to \"slit my wrists or jump off of something.\"    REVIEW OF SYSTEMS  See HPI for further details. All other systems are negative.     PAST MEDICAL HISTORY   has a past medical history of Hypertension and Psychiatric disorder.    SOCIAL HISTORY  Social History     Tobacco Use   • Smoking status: Never Smoker   • Smokeless tobacco: Never Used   Substance and Sexual Activity   • Alcohol use: Yes     Alcohol/week: 12.0 oz     Types: 20 Cans of beer per week     Frequency: 4 or more times a week     Drinks per session: 10 or more   • " "Drug use: Yes     Comment: marijuana, cocaine   • Sexual activity: Not on file       SURGICAL HISTORY  patient denies any surgical history    CURRENT MEDICATIONS  Home Medications     Reviewed by Nestor Patel (Pharmacy Tech) on 11/12/19 at 1255  Med List Status: Complete   Medication Last Dose Status   traZODone (DESYREL) 50 MG Tab 11/11/2019 Active                ALLERGIES  Allergies   Allergen Reactions   • Hctz [Hydrochlorothiazide] Swelling     Pt reports that his hands swells up.       PHYSICAL EXAM  VITAL SIGNS: BP (!) 163/120   Pulse 93   Temp 36.6 °C (97.8 °F) (Temporal)   Resp 18   Ht 1.93 m (6' 4\")   Wt 114.9 kg (253 lb 4.9 oz)   SpO2 96%   BMI 30.83 kg/m²   Pulse ox interpretation: I interpret this pulse ox as normal.  Constitutional: Alert in no apparent distress.  Disheveled  HENT: Normocephalic, atraumatic. Bilateral external ears normal, Nose normal.  Dry mucous membranes.    Eyes: Pupils are equal and reactive, Conjunctiva normal.   Neck: Normal range of motion  Cardiovascular: Normal peripheral perfusion.  Thorax & Lungs: Nonlabored respirations.  Skin: Warm, Dry, No erythema, No rash.   Musculoskeletal: Good range of motion in all major joints. No major deformities noted.   Neurologic: Alert and oriented x4.  Speech clear.  Moves for extremity spontaneously.  Ambulates independently.  Psychiatric: Very anxious.  Pressured speech.  Suicidal.      DIAGNOSTIC STUDIES / PROCEDURES  LABS  Results for orders placed or performed during the hospital encounter of 11/12/19   Urine Drug Screen   Result Value Ref Range    Amphetamines Urine Negative Negative    Barbiturates Negative Negative    Benzodiazepines Negative Negative    Cocaine Metabolite Positive (A) Negative    Methadone Negative Negative    Opiates Negative Negative    Oxycodone Negative Negative    Phencyclidine -Pcp Negative Negative    Propoxyphene Negative Negative    Cannabinoid Metab Negative Negative   POC BREATHALIZER " "  Result Value Ref Range    POC Breathalizer 0.001 0.00 - 0.01 Percent             COURSE & MEDICAL DECISION MAKING  Medical record review: Seen in ED 5 times in the last 2 weeks for combination of play substance abuse, suicidal ideation, alcohol intoxication.  Seen by life skills yesterday, history of bipolar, noncompliant with medication.  No indication for legal hold at that time.  Encouraged follow-up.    Ines Stevenson, is aware of the patient, actually familiar with him and agreeable to consultation.  She interviewed him yesterday and on previous occasions this month.  However, if he is persistently suicidal and noncompliant with outpatient recommendations legal hold is indicated at this time for further evaluation and psychiatric clearance.    Patient has been placed on a legal hold.  He is persistently suicidal due to perceived medical crisis or disease despite reassurance and otherwise normal work-up.  He has a plan to \"cut my wrists or jump off of something.\"  He is noncompliant with follow-up her medications as previously recommended.  He is pressured speech with some manic features today as well.    He is aware of the legal hold process and agreeable, cooperative at this time.    FINAL IMPRESSION  (R45.851) Suicidal ideation  (primary encounter diagnosis)  (F41.9) Anxiety      Electronically signed by: Radha Bryant, 11/12/2019 1:23 PM      This dictation was created using voice recognition software. The accuracy of the dictation is limited to the abilities of the software. I expect there may be some errors of grammar and possibly content. The nursing notes were reviewed and certain aspects of this information were incorporated into this note.        "

## 2019-11-12 NOTE — ED NOTES
Patient brought back from lobby, 1:1 observations maintained.  Urine sample provided.  ED tech with patient and getting undressed and into gown

## 2019-11-12 NOTE — ED NOTES
Patient asked for the nurse.  Patient in room very anxious about the sagging skin under eyes and the foam in the toilet after he urinates.  Continuously brings up that he did bad things to his body and fears that he will be dying soon.

## 2019-11-12 NOTE — ED NOTES
Called [phone] [facility]. Spoke with Norm for nurse to nurse report about potentially accepting patient.

## 2019-11-12 NOTE — ED NOTES
Copy of legal hold faxed to , referral packet has been faxed to [facility], [place] [facility], [facility], [facility] and [facility].

## 2019-11-12 NOTE — ED NOTES
Rounded on patient.  Medicated per orders.  Patient concerned with swelling of feet.  Assessment completed, no evidence of swelling.

## 2019-11-13 VITALS — DIASTOLIC BLOOD PRESSURE: 86 MMHG | SYSTOLIC BLOOD PRESSURE: 144 MMHG

## 2019-11-13 VITALS — SYSTOLIC BLOOD PRESSURE: 147 MMHG | DIASTOLIC BLOOD PRESSURE: 99 MMHG

## 2019-11-13 VITALS — DIASTOLIC BLOOD PRESSURE: 89 MMHG | SYSTOLIC BLOOD PRESSURE: 145 MMHG

## 2019-11-13 VITALS — DIASTOLIC BLOOD PRESSURE: 92 MMHG | SYSTOLIC BLOOD PRESSURE: 135 MMHG

## 2019-11-13 LAB
ALBUMIN SERPL-MCNC: 3.6 G/DL (ref 3.4–5)
ALP SERPL-CCNC: 52 U/L (ref 45–117)
ALT SERPL-CCNC: 45 U/L (ref 12–78)
ANION GAP SERPL CALC-SCNC: 5 MMOL/L (ref 5–15)
BASOPHILS # BLD AUTO: 0.03 X10^3/UL (ref 0–0.1)
BASOPHILS NFR BLD AUTO: 1 % (ref 0–1)
BILIRUB SERPL-MCNC: 0.5 MG/DL (ref 0.2–1)
CALCIUM SERPL-MCNC: 8.5 MG/DL (ref 8.5–10.1)
CHLORIDE SERPL-SCNC: 109 MMOL/L (ref 98–107)
CHOL/HDL RATIO: 5.2
CREAT SERPL-MCNC: 1.06 MG/DL (ref 0.7–1.3)
EOSINOPHIL # BLD AUTO: 0.1 X10^3/UL (ref 0–0.4)
EOSINOPHIL NFR BLD AUTO: 2 % (ref 1–7)
ERYTHROCYTE [DISTWIDTH] IN BLOOD BY AUTOMATED COUNT: 14.3 % (ref 9.4–14.8)
HDL CHOL %: 19 % (ref 26–37)
HDL CHOLESTEROL (DIRECT): 31 MG/DL (ref 40–60)
LDL CHOLESTEROL,CALCULATED: 74 MG/DL (ref 54–169)
LDLC/HDLC SERPL: 2.4 {RATIO} (ref 0.5–3)
LYMPHOCYTES # BLD AUTO: 1.79 X10^3/UL (ref 1–3.4)
LYMPHOCYTES NFR BLD AUTO: 31 % (ref 22–44)
MCH RBC QN AUTO: 31.5 PG (ref 27.5–34.5)
MCHC RBC AUTO-ENTMCNC: 34.2 G/DL (ref 33.2–36.2)
MCV RBC AUTO: 92 FL (ref 81–97)
MD: NO
MONOCYTES # BLD AUTO: 0.65 X10^3/UL (ref 0.2–0.8)
MONOCYTES NFR BLD AUTO: 11 % (ref 2–9)
NEUTROPHILS # BLD AUTO: 3.25 X10^3/UL (ref 1.8–6.8)
NEUTROPHILS NFR BLD AUTO: 56 % (ref 42–75)
PLATELET # BLD AUTO: 157 X10^3/UL (ref 130–400)
PMV BLD AUTO: 8.5 FL (ref 7.4–10.4)
PROT SERPL-MCNC: 6.7 G/DL (ref 6.4–8.2)
RBC # BLD AUTO: 5.16 X10^6/UL (ref 4.38–5.82)
T4 FREE SERPL-MCNC: 1.01 NG/DL (ref 0.76–1.46)
TRIGL SERPL-MCNC: 279 MG/DL (ref 50–200)
VLDLC SERPL CALC-MCNC: 56 MG/DL (ref 0–25)

## 2019-11-13 RX ADMIN — ACAMPROSATE CALCIUM ENTERIC-COATED SCH MG: 333 TABLET, DELAYED RELEASE ORAL at 11:10

## 2019-11-13 RX ADMIN — TRAZODONE HYDROCHLORIDE SCH MG: 100 TABLET, FILM COATED ORAL at 21:32

## 2019-11-13 RX ADMIN — FOLIC ACID SCH MG: 1 TABLET ORAL at 18:25

## 2019-11-13 RX ADMIN — ACAMPROSATE CALCIUM ENTERIC-COATED SCH MG: 333 TABLET, DELAYED RELEASE ORAL at 18:25

## 2019-11-13 RX ADMIN — Medication SCH MG: at 18:25

## 2019-11-13 RX ADMIN — ACAMPROSATE CALCIUM ENTERIC-COATED SCH MG: 333 TABLET, DELAYED RELEASE ORAL at 21:33

## 2019-11-13 NOTE — ED NOTES
ISSA arrived to transfer pt to [facility].   w/ [facility] crew.  Call to Norm at [facility], galina pt en route.

## 2019-11-13 NOTE — ED NOTES
[facility] called to accept patient.  Per charge this nurse called [facility] back to see if they were accepting since they were the first contact.    [facility] will accept patient and would like a call back when patient is en route.

## 2019-11-14 VITALS — SYSTOLIC BLOOD PRESSURE: 122 MMHG | DIASTOLIC BLOOD PRESSURE: 87 MMHG

## 2019-11-14 VITALS — SYSTOLIC BLOOD PRESSURE: 138 MMHG | DIASTOLIC BLOOD PRESSURE: 88 MMHG

## 2019-11-14 RX ADMIN — FLUVOXAMINE MALEATE SCH MG: 50 TABLET, FILM COATED ORAL at 08:28

## 2019-11-14 RX ADMIN — ACAMPROSATE CALCIUM ENTERIC-COATED SCH MG: 333 TABLET, DELAYED RELEASE ORAL at 08:28

## 2019-11-14 RX ADMIN — Medication SCH MG: at 08:27

## 2019-11-14 RX ADMIN — ACAMPROSATE CALCIUM ENTERIC-COATED SCH MG: 333 TABLET, DELAYED RELEASE ORAL at 20:18

## 2019-11-14 RX ADMIN — TRAZODONE HYDROCHLORIDE SCH MG: 100 TABLET, FILM COATED ORAL at 20:19

## 2019-11-14 RX ADMIN — ACAMPROSATE CALCIUM ENTERIC-COATED SCH MG: 333 TABLET, DELAYED RELEASE ORAL at 15:42

## 2019-11-14 RX ADMIN — FOLIC ACID SCH MG: 1 TABLET ORAL at 08:27

## 2019-11-15 VITALS — SYSTOLIC BLOOD PRESSURE: 128 MMHG | DIASTOLIC BLOOD PRESSURE: 89 MMHG

## 2019-11-15 VITALS — DIASTOLIC BLOOD PRESSURE: 91 MMHG | SYSTOLIC BLOOD PRESSURE: 133 MMHG

## 2019-11-15 RX ADMIN — ACAMPROSATE CALCIUM ENTERIC-COATED SCH MG: 333 TABLET, DELAYED RELEASE ORAL at 09:00

## 2019-11-15 RX ADMIN — FLUVOXAMINE MALEATE SCH MG: 50 TABLET, FILM COATED ORAL at 09:00

## 2019-11-15 RX ADMIN — FOLIC ACID SCH MG: 1 TABLET ORAL at 09:00

## 2019-11-15 RX ADMIN — Medication SCH MG: at 09:00

## 2019-11-15 RX ADMIN — TRAZODONE HYDROCHLORIDE SCH MG: 100 TABLET, FILM COATED ORAL at 20:41

## 2019-11-15 RX ADMIN — ACAMPROSATE CALCIUM ENTERIC-COATED SCH MG: 333 TABLET, DELAYED RELEASE ORAL at 20:41

## 2019-11-15 RX ADMIN — ACAMPROSATE CALCIUM ENTERIC-COATED SCH MG: 333 TABLET, DELAYED RELEASE ORAL at 16:00

## 2019-11-16 VITALS — SYSTOLIC BLOOD PRESSURE: 129 MMHG | DIASTOLIC BLOOD PRESSURE: 88 MMHG

## 2019-11-16 RX ADMIN — FLUVOXAMINE MALEATE SCH MG: 50 TABLET, FILM COATED ORAL at 08:20

## 2019-11-16 RX ADMIN — ACAMPROSATE CALCIUM ENTERIC-COATED SCH MG: 333 TABLET, DELAYED RELEASE ORAL at 08:20

## 2019-11-16 RX ADMIN — Medication SCH MG: at 08:20

## 2019-11-16 RX ADMIN — FOLIC ACID SCH MG: 1 TABLET ORAL at 08:20

## 2020-04-09 ENCOUNTER — HOSPITAL ENCOUNTER (EMERGENCY)
Dept: HOSPITAL 8 - ED | Age: 40
Discharge: HOME | End: 2020-04-09
Payer: COMMERCIAL

## 2020-04-09 VITALS — BODY MASS INDEX: 28.19 KG/M2 | WEIGHT: 231.49 LBS | HEIGHT: 76 IN

## 2020-04-09 VITALS — SYSTOLIC BLOOD PRESSURE: 157 MMHG | DIASTOLIC BLOOD PRESSURE: 90 MMHG

## 2020-04-09 DIAGNOSIS — I10: ICD-10-CM

## 2020-04-09 DIAGNOSIS — F41.1: ICD-10-CM

## 2020-04-09 DIAGNOSIS — R45.851: ICD-10-CM

## 2020-04-09 DIAGNOSIS — F32.9: Primary | ICD-10-CM

## 2020-04-09 LAB
ALBUMIN SERPL-MCNC: 3.9 G/DL (ref 3.4–5)
ALP SERPL-CCNC: 78 U/L (ref 45–117)
ALT SERPL-CCNC: 48 U/L (ref 12–78)
ANION GAP SERPL CALC-SCNC: 6 MMOL/L (ref 5–15)
BASOPHILS # BLD AUTO: 0.02 X10^3/UL (ref 0–0.1)
BASOPHILS NFR BLD AUTO: 0 % (ref 0–1)
BILIRUB SERPL-MCNC: 0.5 MG/DL (ref 0.2–1)
CALCIUM SERPL-MCNC: 8.6 MG/DL (ref 8.5–10.1)
CHLORIDE SERPL-SCNC: 105 MMOL/L (ref 98–107)
CREAT SERPL-MCNC: 1.02 MG/DL (ref 0.7–1.3)
EOSINOPHIL # BLD AUTO: 0.2 X10^3/UL (ref 0–0.4)
EOSINOPHIL NFR BLD AUTO: 4 % (ref 1–7)
ERYTHROCYTE [DISTWIDTH] IN BLOOD BY AUTOMATED COUNT: 15 % (ref 9.4–14.8)
LYMPHOCYTES # BLD AUTO: 1.2 X10^3/UL (ref 1–3.4)
LYMPHOCYTES NFR BLD AUTO: 22 % (ref 22–44)
MCH RBC QN AUTO: 30.8 PG (ref 27.5–34.5)
MCHC RBC AUTO-ENTMCNC: 34.3 G/DL (ref 33.2–36.2)
MCV RBC AUTO: 89.6 FL (ref 81–97)
MD: NO
MONOCYTES # BLD AUTO: 0.48 X10^3/UL (ref 0.2–0.8)
MONOCYTES NFR BLD AUTO: 9 % (ref 2–9)
NEUTROPHILS # BLD AUTO: 3.51 X10^3/UL (ref 1.8–6.8)
NEUTROPHILS NFR BLD AUTO: 65 % (ref 42–75)
PLATELET # BLD AUTO: 149 X10^3/UL (ref 130–400)
PMV BLD AUTO: 8 FL (ref 7.4–10.4)
PROT SERPL-MCNC: 7.6 G/DL (ref 6.4–8.2)
RBC # BLD AUTO: 5.29 X10^6/UL (ref 4.38–5.82)
VANCOMYCIN TROUGH SERPL-MCNC: < 1.7 MG/DL (ref 2.8–20)

## 2020-04-09 PROCEDURE — 80307 DRUG TEST PRSMV CHEM ANLYZR: CPT

## 2020-04-09 PROCEDURE — 36415 COLL VENOUS BLD VENIPUNCTURE: CPT

## 2020-04-09 PROCEDURE — 80053 COMPREHEN METABOLIC PANEL: CPT

## 2020-04-09 PROCEDURE — 99284 EMERGENCY DEPT VISIT MOD MDM: CPT

## 2020-04-09 PROCEDURE — 85025 COMPLETE CBC W/AUTO DIFF WBC: CPT

## 2020-04-09 NOTE — NUR
pt denies SI at this time, but states he has a terminal illness that causes 
edema all over his body, no edema noted.  Pt anxious, but cooperative

## 2020-04-09 NOTE — NUR
TASK RN: PT IN SECURE ROOM, ALL BELONGINGS REMOVED, PLACED IN ONE BAG, LABELED, 
AND INTO BELONGINGS LOCKER. PT PLACED IN GOWN AND GUARD BRADFORD IN THE DOWN 
POSITION. BLANKET PROVIDED. SITTER AT DOORWAY FOR FREQUENT CHECKS.

## 2020-04-10 ENCOUNTER — HOSPITAL ENCOUNTER (EMERGENCY)
Dept: HOSPITAL 8 - ED | Age: 40
Discharge: HOME | End: 2020-04-10
Payer: COMMERCIAL

## 2020-04-10 VITALS — HEIGHT: 76 IN | WEIGHT: 263.45 LBS | BODY MASS INDEX: 32.08 KG/M2

## 2020-04-10 VITALS — SYSTOLIC BLOOD PRESSURE: 181 MMHG | DIASTOLIC BLOOD PRESSURE: 107 MMHG

## 2020-04-10 DIAGNOSIS — F41.9: ICD-10-CM

## 2020-04-10 DIAGNOSIS — F33.9: ICD-10-CM

## 2020-04-10 DIAGNOSIS — F14.122: Primary | ICD-10-CM

## 2020-04-10 PROCEDURE — 99281 EMR DPT VST MAYX REQ PHY/QHP: CPT

## 2020-04-17 ENCOUNTER — HOSPITAL ENCOUNTER (INPATIENT)
Dept: HOSPITAL 8 - ED | Age: 40
LOS: 2 days | Discharge: HOME | DRG: 641 | End: 2020-04-19
Attending: FAMILY MEDICINE | Admitting: FAMILY MEDICINE
Payer: COMMERCIAL

## 2020-04-17 VITALS — SYSTOLIC BLOOD PRESSURE: 154 MMHG | DIASTOLIC BLOOD PRESSURE: 110 MMHG

## 2020-04-17 VITALS — BODY MASS INDEX: 31.49 KG/M2 | HEIGHT: 76 IN | WEIGHT: 258.6 LBS

## 2020-04-17 VITALS — SYSTOLIC BLOOD PRESSURE: 152 MMHG | DIASTOLIC BLOOD PRESSURE: 98 MMHG

## 2020-04-17 VITALS — SYSTOLIC BLOOD PRESSURE: 146 MMHG | DIASTOLIC BLOOD PRESSURE: 102 MMHG

## 2020-04-17 DIAGNOSIS — F41.0: ICD-10-CM

## 2020-04-17 DIAGNOSIS — F14.10: ICD-10-CM

## 2020-04-17 DIAGNOSIS — F42.9: ICD-10-CM

## 2020-04-17 DIAGNOSIS — E83.39: ICD-10-CM

## 2020-04-17 DIAGNOSIS — F31.9: ICD-10-CM

## 2020-04-17 DIAGNOSIS — Z88.8: ICD-10-CM

## 2020-04-17 DIAGNOSIS — E87.1: Primary | ICD-10-CM

## 2020-04-17 DIAGNOSIS — F22: ICD-10-CM

## 2020-04-17 DIAGNOSIS — F10.239: ICD-10-CM

## 2020-04-17 DIAGNOSIS — I10: ICD-10-CM

## 2020-04-17 LAB
ALBUMIN SERPL-MCNC: 3.9 G/DL (ref 3.4–5)
ALP SERPL-CCNC: 68 U/L (ref 45–117)
ALT SERPL-CCNC: 65 U/L (ref 12–78)
ANION GAP SERPL CALC-SCNC: 10 MMOL/L (ref 5–15)
BASOPHILS # BLD AUTO: 0.02 X10^3/UL (ref 0–0.1)
BASOPHILS NFR BLD AUTO: 0 % (ref 0–1)
BILIRUB SERPL-MCNC: 0.8 MG/DL (ref 0.2–1)
CALCIUM SERPL-MCNC: 8.7 MG/DL (ref 8.5–10.1)
CHLORIDE SERPL-SCNC: 101 MMOL/L (ref 98–107)
CREAT SERPL-MCNC: 1.08 MG/DL (ref 0.7–1.3)
EOSINOPHIL # BLD AUTO: 0 X10^3/UL (ref 0–0.4)
EOSINOPHIL NFR BLD AUTO: 0 % (ref 1–7)
ERYTHROCYTE [DISTWIDTH] IN BLOOD BY AUTOMATED COUNT: 14.5 % (ref 9.4–14.8)
INR PPP: 0.98 (ref 0.93–1.1)
LYMPHOCYTES # BLD AUTO: 0.7 X10^3/UL (ref 1–3.4)
LYMPHOCYTES NFR BLD AUTO: 11 % (ref 22–44)
MCH RBC QN AUTO: 30.7 PG (ref 27.5–34.5)
MCHC RBC AUTO-ENTMCNC: 34.6 G/DL (ref 33.2–36.2)
MCV RBC AUTO: 88.8 FL (ref 81–97)
MD: NO
MONOCYTES # BLD AUTO: 0.51 X10^3/UL (ref 0.2–0.8)
MONOCYTES NFR BLD AUTO: 8 % (ref 2–9)
NEUTROPHILS # BLD AUTO: 5.2 X10^3/UL (ref 1.8–6.8)
NEUTROPHILS NFR BLD AUTO: 81 % (ref 42–75)
PLATELET # BLD AUTO: 159 X10^3/UL (ref 130–400)
PMV BLD AUTO: 8.4 FL (ref 7.4–10.4)
PROT SERPL-MCNC: 7.4 G/DL (ref 6.4–8.2)
PROTHROMBIN TIME: 10.4 SECONDS (ref 9.6–11.5)
RBC # BLD AUTO: 5.39 X10^6/UL (ref 4.38–5.82)

## 2020-04-17 PROCEDURE — 80048 BASIC METABOLIC PNL TOTAL CA: CPT

## 2020-04-17 PROCEDURE — 80307 DRUG TEST PRSMV CHEM ANLYZR: CPT

## 2020-04-17 PROCEDURE — 85025 COMPLETE CBC W/AUTO DIFF WBC: CPT

## 2020-04-17 PROCEDURE — 80053 COMPREHEN METABOLIC PANEL: CPT

## 2020-04-17 PROCEDURE — 96366 THER/PROPH/DIAG IV INF ADDON: CPT

## 2020-04-17 PROCEDURE — 83735 ASSAY OF MAGNESIUM: CPT

## 2020-04-17 PROCEDURE — 83690 ASSAY OF LIPASE: CPT

## 2020-04-17 PROCEDURE — 99285 EMERGENCY DEPT VISIT HI MDM: CPT

## 2020-04-17 PROCEDURE — 87324 CLOSTRIDIUM AG IA: CPT

## 2020-04-17 PROCEDURE — 84100 ASSAY OF PHOSPHORUS: CPT

## 2020-04-17 PROCEDURE — 36415 COLL VENOUS BLD VENIPUNCTURE: CPT

## 2020-04-17 PROCEDURE — 82272 OCCULT BLD FECES 1-3 TESTS: CPT

## 2020-04-17 PROCEDURE — 85610 PROTHROMBIN TIME: CPT

## 2020-04-17 PROCEDURE — 84443 ASSAY THYROID STIM HORMONE: CPT

## 2020-04-17 PROCEDURE — 93005 ELECTROCARDIOGRAM TRACING: CPT

## 2020-04-17 PROCEDURE — 96365 THER/PROPH/DIAG IV INF INIT: CPT

## 2020-04-17 RX ADMIN — LORAZEPAM PRN MG: 2 INJECTION INTRAMUSCULAR; INTRAVENOUS at 15:53

## 2020-04-17 RX ADMIN — TRAZODONE HYDROCHLORIDE PRN MG: 50 TABLET ORAL at 22:07

## 2020-04-17 RX ADMIN — LORAZEPAM PRN MG: 2 INJECTION INTRAMUSCULAR; INTRAVENOUS at 17:02

## 2020-04-17 RX ADMIN — LORAZEPAM PRN MG: 2 INJECTION INTRAMUSCULAR; INTRAVENOUS at 14:06

## 2020-04-17 RX ADMIN — ENOXAPARIN SODIUM SCH MG: 40 INJECTION SUBCUTANEOUS at 18:12

## 2020-04-17 RX ADMIN — LABETALOL HYDROCHLORIDE PRN MG: 5 INJECTION INTRAVENOUS at 18:44

## 2020-04-17 NOTE — NUR
Late Entry: Rounded on pt, updated on POC, pt denies additonal needs at this 
time, P/W/D, NAD, RESP WNL, WCTM. call light within reach.

## 2020-04-17 NOTE — NUR
Rounded on pt, pt denies additonal needs at this time, P/W/D, NAD, RESP WNL, 
MAEx4, urinal emptied WCTM. waiting for med tele bed.

## 2020-04-17 NOTE — NUR
Rounded on pt, updated on POC, pt denies additonal needs at this time, P/W/D, 
NAD, RESP WNL, WCTM. waiting on admit bed.

## 2020-04-17 NOTE — NUR
Report called to [facility] CLAROS. Pt ready to go up at this time. Pt updated on POC, 
NAD, WCTM until transferred.

## 2020-04-18 VITALS — DIASTOLIC BLOOD PRESSURE: 95 MMHG | SYSTOLIC BLOOD PRESSURE: 133 MMHG

## 2020-04-18 VITALS — DIASTOLIC BLOOD PRESSURE: 90 MMHG | SYSTOLIC BLOOD PRESSURE: 134 MMHG

## 2020-04-18 VITALS — DIASTOLIC BLOOD PRESSURE: 81 MMHG | SYSTOLIC BLOOD PRESSURE: 145 MMHG

## 2020-04-18 VITALS — DIASTOLIC BLOOD PRESSURE: 76 MMHG | SYSTOLIC BLOOD PRESSURE: 155 MMHG

## 2020-04-18 LAB
ANION GAP SERPL CALC-SCNC: 5 MMOL/L (ref 5–15)
BASOPHILS # BLD AUTO: 0.03 X10^3/UL (ref 0–0.1)
BASOPHILS NFR BLD AUTO: 1 % (ref 0–1)
CALCIUM SERPL-MCNC: 8 MG/DL (ref 8.5–10.1)
CHLORIDE SERPL-SCNC: 107 MMOL/L (ref 98–107)
CLOSTRIDIUM DIFFICILE ANTIGEN: NEGATIVE
CLOSTRIDIUM DIFFICILE TOXIN: NEGATIVE
CREAT SERPL-MCNC: 1.07 MG/DL (ref 0.7–1.3)
EOSINOPHIL # BLD AUTO: 0.07 X10^3/UL (ref 0–0.4)
EOSINOPHIL NFR BLD AUTO: 1 % (ref 1–7)
ERYTHROCYTE [DISTWIDTH] IN BLOOD BY AUTOMATED COUNT: 14.6 % (ref 9.4–14.8)
GFR SERPL CREATININE-BSD FRML MDRD: NEGATIVE ML/MIN/{1.73_M2}
LYMPHOCYTES # BLD AUTO: 1.9 X10^3/UL (ref 1–3.4)
LYMPHOCYTES NFR BLD AUTO: 32 % (ref 22–44)
MCH RBC QN AUTO: 30.4 PG (ref 27.5–34.5)
MCHC RBC AUTO-ENTMCNC: 33.9 G/DL (ref 33.2–36.2)
MCV RBC AUTO: 89.6 FL (ref 81–97)
MD: NO
MONOCYTES # BLD AUTO: 0.64 X10^3/UL (ref 0.2–0.8)
MONOCYTES NFR BLD AUTO: 11 % (ref 2–9)
NEUTROPHILS # BLD AUTO: 3.26 X10^3/UL (ref 1.8–6.8)
NEUTROPHILS NFR BLD AUTO: 55 % (ref 42–75)
PLATELET # BLD AUTO: 129 X10^3/UL (ref 130–400)
PMV BLD AUTO: 8.5 FL (ref 7.4–10.4)
RBC # BLD AUTO: 5 X10^6/UL (ref 4.38–5.82)

## 2020-04-18 RX ADMIN — TRAZODONE HYDROCHLORIDE PRN MG: 50 TABLET ORAL at 21:44

## 2020-04-18 RX ADMIN — Medication SCH TAB: at 07:53

## 2020-04-18 RX ADMIN — ENOXAPARIN SODIUM SCH MG: 40 INJECTION SUBCUTANEOUS at 18:48

## 2020-04-18 RX ADMIN — FLUVOXAMINE MALEATE SCH MG: 50 TABLET, FILM COATED ORAL at 07:53

## 2020-04-19 VITALS — SYSTOLIC BLOOD PRESSURE: 141 MMHG | DIASTOLIC BLOOD PRESSURE: 88 MMHG

## 2020-04-19 VITALS — DIASTOLIC BLOOD PRESSURE: 93 MMHG | SYSTOLIC BLOOD PRESSURE: 148 MMHG

## 2020-04-19 VITALS — SYSTOLIC BLOOD PRESSURE: 193 MMHG | DIASTOLIC BLOOD PRESSURE: 121 MMHG

## 2020-04-19 VITALS — DIASTOLIC BLOOD PRESSURE: 101 MMHG | SYSTOLIC BLOOD PRESSURE: 149 MMHG

## 2020-04-19 VITALS — SYSTOLIC BLOOD PRESSURE: 197 MMHG | DIASTOLIC BLOOD PRESSURE: 107 MMHG

## 2020-04-19 VITALS — DIASTOLIC BLOOD PRESSURE: 111 MMHG | SYSTOLIC BLOOD PRESSURE: 170 MMHG

## 2020-04-19 LAB
ALBUMIN SERPL-MCNC: 3.3 G/DL (ref 3.4–5)
ALP SERPL-CCNC: 54 U/L (ref 45–117)
ALT SERPL-CCNC: 55 U/L (ref 12–78)
ANION GAP SERPL CALC-SCNC: 7 MMOL/L (ref 5–15)
BILIRUB SERPL-MCNC: 0.9 MG/DL (ref 0.2–1)
CALCIUM SERPL-MCNC: 8.3 MG/DL (ref 8.5–10.1)
CHLORIDE SERPL-SCNC: 108 MMOL/L (ref 98–107)
CREAT SERPL-MCNC: 0.98 MG/DL (ref 0.7–1.3)
PROT SERPL-MCNC: 6.5 G/DL (ref 6.4–8.2)

## 2020-04-19 RX ADMIN — FLUVOXAMINE MALEATE SCH MG: 50 TABLET, FILM COATED ORAL at 08:30

## 2020-04-19 RX ADMIN — Medication SCH TAB: at 08:30

## 2020-04-19 RX ADMIN — LABETALOL HYDROCHLORIDE PRN MG: 5 INJECTION INTRAVENOUS at 13:44

## 2022-02-15 NOTE — NUR
PT C/O BLOODY DIARRHEA AT THIS TIME. EDMD NOTIFIED. Oriented - self; Oriented - place; Oriented - time